# Patient Record
Sex: FEMALE | Race: BLACK OR AFRICAN AMERICAN | NOT HISPANIC OR LATINO | Employment: FULL TIME | URBAN - METROPOLITAN AREA
[De-identification: names, ages, dates, MRNs, and addresses within clinical notes are randomized per-mention and may not be internally consistent; named-entity substitution may affect disease eponyms.]

---

## 2018-08-21 ENCOUNTER — OFFICE VISIT CONVERTED (OUTPATIENT)
Dept: FAMILY MEDICINE CLINIC | Facility: CLINIC | Age: 53
End: 2018-08-21
Attending: NURSE PRACTITIONER

## 2018-08-21 ENCOUNTER — CONVERSION ENCOUNTER (OUTPATIENT)
Dept: FAMILY MEDICINE CLINIC | Facility: CLINIC | Age: 53
End: 2018-08-21

## 2018-10-16 ENCOUNTER — CONVERSION ENCOUNTER (OUTPATIENT)
Dept: GENERAL RADIOLOGY | Facility: HOSPITAL | Age: 53
End: 2018-10-16

## 2019-01-08 ENCOUNTER — OFFICE VISIT CONVERTED (OUTPATIENT)
Dept: FAMILY MEDICINE CLINIC | Facility: CLINIC | Age: 54
End: 2019-01-08
Attending: NURSE PRACTITIONER

## 2019-03-11 ENCOUNTER — OFFICE VISIT CONVERTED (OUTPATIENT)
Dept: FAMILY MEDICINE CLINIC | Facility: CLINIC | Age: 54
End: 2019-03-11
Attending: NURSE PRACTITIONER

## 2019-03-11 ENCOUNTER — HOSPITAL ENCOUNTER (OUTPATIENT)
Dept: FAMILY MEDICINE CLINIC | Facility: CLINIC | Age: 54
Discharge: HOME | End: 2019-03-11
Attending: NURSE PRACTITIONER

## 2019-03-14 LAB — EYE OR EAR CULTURE: NORMAL

## 2019-07-15 ENCOUNTER — OFFICE VISIT CONVERTED (OUTPATIENT)
Dept: FAMILY MEDICINE CLINIC | Facility: CLINIC | Age: 54
End: 2019-07-15
Attending: NURSE PRACTITIONER

## 2019-07-18 ENCOUNTER — HOSPITAL ENCOUNTER (OUTPATIENT)
Dept: LAB | Facility: HOSPITAL | Age: 54
Discharge: HOME | End: 2019-07-18
Attending: NURSE PRACTITIONER

## 2019-07-18 LAB
ALBUMIN SERPL-MCNC: 4.3 G/DL (ref 3.5–5)
ALBUMIN/GLOB SERPL: 1.5 {RATIO} (ref 1.4–2.6)
ALP SERPL-CCNC: 97 U/L (ref 53–141)
ALT SERPL-CCNC: 12 U/L (ref 10–40)
ANION GAP SERPL CALC-SCNC: 15 MMOL/L (ref 8–19)
AST SERPL-CCNC: 16 U/L (ref 15–50)
BASOPHILS # BLD AUTO: 0.02 10*3/UL (ref 0–0.2)
BASOPHILS NFR BLD AUTO: 0.5 % (ref 0–3)
BILIRUB SERPL-MCNC: 0.42 MG/DL (ref 0.2–1.3)
BUN SERPL-MCNC: 12 MG/DL (ref 5–25)
BUN/CREAT SERPL: 15 {RATIO} (ref 6–20)
CALCIUM SERPL-MCNC: 9.2 MG/DL (ref 8.7–10.4)
CHLORIDE SERPL-SCNC: 107 MMOL/L (ref 99–111)
CHOLEST SERPL-MCNC: 138 MG/DL (ref 107–200)
CHOLEST/HDLC SERPL: 3 {RATIO} (ref 3–6)
CONV ABS IMM GRAN: 0.01 10*3/UL (ref 0–0.2)
CONV CO2: 25 MMOL/L (ref 22–32)
CONV IMMATURE GRAN: 0.2 % (ref 0–1.8)
CONV TOTAL PROTEIN: 7.2 G/DL (ref 6.3–8.2)
CREAT UR-MCNC: 0.81 MG/DL (ref 0.5–0.9)
DEPRECATED RDW RBC AUTO: 41.7 FL (ref 36.4–46.3)
EOSINOPHIL # BLD AUTO: 0.12 10*3/UL (ref 0–0.7)
EOSINOPHIL # BLD AUTO: 2.8 % (ref 0–7)
ERYTHROCYTE [DISTWIDTH] IN BLOOD BY AUTOMATED COUNT: 12.6 % (ref 11.7–14.4)
GFR SERPLBLD BASED ON 1.73 SQ M-ARVRAT: >60 ML/MIN/{1.73_M2}
GLOBULIN UR ELPH-MCNC: 2.9 G/DL (ref 2–3.5)
GLUCOSE SERPL-MCNC: 90 MG/DL (ref 65–99)
HBA1C MFR BLD: 11.4 G/DL (ref 12–16)
HCT VFR BLD AUTO: 35.9 % (ref 37–47)
HDLC SERPL-MCNC: 46 MG/DL (ref 40–60)
LDLC SERPL CALC-MCNC: 77 MG/DL (ref 70–100)
LYMPHOCYTES # BLD AUTO: 2.37 10*3/UL (ref 1–5)
MCH RBC QN AUTO: 29.1 PG (ref 27–31)
MCHC RBC AUTO-ENTMCNC: 31.8 G/DL (ref 33–37)
MCV RBC AUTO: 91.6 FL (ref 81–99)
MONOCYTES # BLD AUTO: 0.41 10*3/UL (ref 0.2–1.2)
MONOCYTES NFR BLD AUTO: 9.7 % (ref 3–10)
NEUTROPHILS # BLD AUTO: 1.31 10*3/UL (ref 2–8)
NEUTROPHILS NFR BLD AUTO: 30.9 % (ref 30–85)
NRBC CBCN: 0 % (ref 0–0.7)
OSMOLALITY SERPL CALC.SUM OF ELEC: 295 MOSM/KG (ref 273–304)
PLATELET # BLD AUTO: 256 10*3/UL (ref 130–400)
PMV BLD AUTO: 10.7 FL (ref 9.4–12.3)
POTASSIUM SERPL-SCNC: 4.1 MMOL/L (ref 3.5–5.3)
RBC # BLD AUTO: 3.92 10*6/UL (ref 4.2–5.4)
SODIUM SERPL-SCNC: 143 MMOL/L (ref 135–147)
T4 FREE SERPL-MCNC: 0.9 NG/DL (ref 0.9–1.8)
TRIGL SERPL-MCNC: 77 MG/DL (ref 40–150)
TSH SERPL-ACNC: 0.64 M[IU]/L (ref 0.27–4.2)
VARIANT LYMPHS NFR BLD MANUAL: 55.9 % (ref 20–45)
VLDLC SERPL-MCNC: 15 MG/DL (ref 5–37)
WBC # BLD AUTO: 4.24 10*3/UL (ref 4.8–10.8)

## 2019-07-19 ENCOUNTER — HOSPITAL ENCOUNTER (OUTPATIENT)
Dept: LAB | Facility: HOSPITAL | Age: 54
Discharge: HOME | End: 2019-07-19
Attending: NURSE PRACTITIONER

## 2019-07-19 LAB
FOLATE SERPL-MCNC: 15.8 NG/ML (ref 4.8–20)
IRON SATN MFR SERPL: 26 % (ref 20–55)
IRON SERPL-MCNC: 82 UG/DL (ref 60–170)
TIBC SERPL-MCNC: 319 UG/DL (ref 245–450)
TRANSFERRIN SERPL-MCNC: 223 MG/DL (ref 250–380)
VIT B12 SERPL-MCNC: 356 PG/ML (ref 211–911)

## 2019-11-19 ENCOUNTER — OFFICE VISIT CONVERTED (OUTPATIENT)
Dept: FAMILY MEDICINE CLINIC | Facility: CLINIC | Age: 54
End: 2019-11-19
Attending: NURSE PRACTITIONER

## 2020-01-03 ENCOUNTER — HOSPITAL ENCOUNTER (OUTPATIENT)
Dept: GENERAL RADIOLOGY | Facility: HOSPITAL | Age: 55
Discharge: HOME | End: 2020-01-03
Attending: NURSE PRACTITIONER

## 2020-01-15 ENCOUNTER — CONVERSION ENCOUNTER (OUTPATIENT)
Dept: FAMILY MEDICINE CLINIC | Facility: CLINIC | Age: 55
End: 2020-01-15

## 2020-01-15 ENCOUNTER — HOSPITAL ENCOUNTER (OUTPATIENT)
Dept: FAMILY MEDICINE CLINIC | Facility: CLINIC | Age: 55
Discharge: HOME | End: 2020-01-15
Attending: NURSE PRACTITIONER

## 2020-01-15 ENCOUNTER — OFFICE VISIT CONVERTED (OUTPATIENT)
Dept: FAMILY MEDICINE CLINIC | Facility: CLINIC | Age: 55
End: 2020-01-15
Attending: NURSE PRACTITIONER

## 2020-01-16 ENCOUNTER — HOSPITAL ENCOUNTER (OUTPATIENT)
Dept: LAB | Facility: HOSPITAL | Age: 55
Discharge: HOME | End: 2020-01-16
Attending: NURSE PRACTITIONER

## 2020-01-16 LAB
ALBUMIN SERPL-MCNC: 4.2 G/DL (ref 3.5–5)
ALBUMIN/GLOB SERPL: 1.2 {RATIO} (ref 1.4–2.6)
ALP SERPL-CCNC: 125 U/L (ref 53–141)
ALT SERPL-CCNC: 18 U/L (ref 10–40)
ANION GAP SERPL CALC-SCNC: 14 MMOL/L (ref 8–19)
AST SERPL-CCNC: 21 U/L (ref 15–50)
BASOPHILS # BLD AUTO: 0.03 10*3/UL (ref 0–0.2)
BASOPHILS NFR BLD AUTO: 0.7 % (ref 0–3)
BILIRUB SERPL-MCNC: 0.27 MG/DL (ref 0.2–1.3)
BUN SERPL-MCNC: 10 MG/DL (ref 5–25)
BUN/CREAT SERPL: 13 {RATIO} (ref 6–20)
CALCIUM SERPL-MCNC: 9.3 MG/DL (ref 8.7–10.4)
CHLORIDE SERPL-SCNC: 106 MMOL/L (ref 99–111)
CHOLEST SERPL-MCNC: 148 MG/DL (ref 107–200)
CHOLEST/HDLC SERPL: 3 {RATIO} (ref 3–6)
CONV ABS IMM GRAN: 0.01 10*3/UL (ref 0–0.2)
CONV CO2: 24 MMOL/L (ref 22–32)
CONV IMMATURE GRAN: 0.2 % (ref 0–1.8)
CONV TOTAL PROTEIN: 7.6 G/DL (ref 6.3–8.2)
CREAT UR-MCNC: 0.8 MG/DL (ref 0.5–0.9)
DEPRECATED RDW RBC AUTO: 43.8 FL (ref 36.4–46.3)
EOSINOPHIL # BLD AUTO: 0.15 10*3/UL (ref 0–0.7)
EOSINOPHIL # BLD AUTO: 3.4 % (ref 0–7)
ERYTHROCYTE [DISTWIDTH] IN BLOOD BY AUTOMATED COUNT: 13 % (ref 11.7–14.4)
GFR SERPLBLD BASED ON 1.73 SQ M-ARVRAT: >60 ML/MIN/{1.73_M2}
GLOBULIN UR ELPH-MCNC: 3.4 G/DL (ref 2–3.5)
GLUCOSE SERPL-MCNC: 85 MG/DL (ref 65–99)
HCT VFR BLD AUTO: 42 % (ref 37–47)
HDLC SERPL-MCNC: 49 MG/DL (ref 40–60)
HGB BLD-MCNC: 12.9 G/DL (ref 12–16)
LDLC SERPL CALC-MCNC: 91 MG/DL (ref 70–100)
LYMPHOCYTES # BLD AUTO: 2.6 10*3/UL (ref 1–5)
LYMPHOCYTES NFR BLD AUTO: 58.4 % (ref 20–45)
MCH RBC QN AUTO: 28.1 PG (ref 27–31)
MCHC RBC AUTO-ENTMCNC: 30.7 G/DL (ref 33–37)
MCV RBC AUTO: 91.5 FL (ref 81–99)
MONOCYTES # BLD AUTO: 0.41 10*3/UL (ref 0.2–1.2)
MONOCYTES NFR BLD AUTO: 9.2 % (ref 3–10)
NEUTROPHILS # BLD AUTO: 1.25 10*3/UL (ref 2–8)
NEUTROPHILS NFR BLD AUTO: 28.1 % (ref 30–85)
NRBC CBCN: 0 % (ref 0–0.7)
OSMOLALITY SERPL CALC.SUM OF ELEC: 288 MOSM/KG (ref 273–304)
PLATELET # BLD AUTO: 290 10*3/UL (ref 130–400)
PMV BLD AUTO: 10.5 FL (ref 9.4–12.3)
POTASSIUM SERPL-SCNC: 4.1 MMOL/L (ref 3.5–5.3)
RBC # BLD AUTO: 4.59 10*6/UL (ref 4.2–5.4)
SODIUM SERPL-SCNC: 140 MMOL/L (ref 135–147)
T4 FREE SERPL-MCNC: 1 NG/DL (ref 0.9–1.8)
TRIGL SERPL-MCNC: 40 MG/DL (ref 40–150)
TSH SERPL-ACNC: 0.82 M[IU]/L (ref 0.27–4.2)
VLDLC SERPL-MCNC: 8 MG/DL (ref 5–37)
WBC # BLD AUTO: 4.45 10*3/UL (ref 4.8–10.8)

## 2020-01-20 LAB
CONV LAST MENSTURAL PERIOD: NORMAL
SPECIMEN SOURCE: NORMAL
SPECIMEN SOURCE: NORMAL
THIN PREP CVX: NORMAL

## 2020-07-27 ENCOUNTER — OFFICE VISIT CONVERTED (OUTPATIENT)
Dept: FAMILY MEDICINE CLINIC | Facility: CLINIC | Age: 55
End: 2020-07-27
Attending: NURSE PRACTITIONER

## 2020-07-27 ENCOUNTER — HOSPITAL ENCOUNTER (OUTPATIENT)
Dept: LAB | Facility: HOSPITAL | Age: 55
Discharge: HOME | End: 2020-07-27
Attending: NURSE PRACTITIONER

## 2020-07-27 LAB
ALBUMIN SERPL-MCNC: 4.1 G/DL (ref 3.5–5)
ALBUMIN/GLOB SERPL: 1.4 {RATIO} (ref 1.4–2.6)
ALP SERPL-CCNC: 116 U/L (ref 53–141)
ALT SERPL-CCNC: 12 U/L (ref 10–40)
ANION GAP SERPL CALC-SCNC: 14 MMOL/L (ref 8–19)
AST SERPL-CCNC: 17 U/L (ref 15–50)
BASOPHILS # BLD AUTO: 0.02 10*3/UL (ref 0–0.2)
BASOPHILS NFR BLD AUTO: 0.5 % (ref 0–3)
BILIRUB SERPL-MCNC: 0.32 MG/DL (ref 0.2–1.3)
BUN SERPL-MCNC: 12 MG/DL (ref 5–25)
BUN/CREAT SERPL: 15 {RATIO} (ref 6–20)
CALCIUM SERPL-MCNC: 9.2 MG/DL (ref 8.7–10.4)
CHLORIDE SERPL-SCNC: 103 MMOL/L (ref 99–111)
CHOLEST SERPL-MCNC: 201 MG/DL (ref 107–200)
CHOLEST/HDLC SERPL: 3.3 {RATIO} (ref 3–6)
CONV ABS IMM GRAN: 0.01 10*3/UL (ref 0–0.2)
CONV CO2: 27 MMOL/L (ref 22–32)
CONV IMMATURE GRAN: 0.3 % (ref 0–1.8)
CONV TOTAL PROTEIN: 7.1 G/DL (ref 6.3–8.2)
CREAT UR-MCNC: 0.8 MG/DL (ref 0.5–0.9)
DEPRECATED RDW RBC AUTO: 42.2 FL (ref 36.4–46.3)
EOSINOPHIL # BLD AUTO: 0.1 10*3/UL (ref 0–0.7)
EOSINOPHIL # BLD AUTO: 2.5 % (ref 0–7)
ERYTHROCYTE [DISTWIDTH] IN BLOOD BY AUTOMATED COUNT: 12.6 % (ref 11.7–14.4)
GFR SERPLBLD BASED ON 1.73 SQ M-ARVRAT: >60 ML/MIN/{1.73_M2}
GLOBULIN UR ELPH-MCNC: 3 G/DL (ref 2–3.5)
GLUCOSE SERPL-MCNC: 84 MG/DL (ref 65–99)
HCT VFR BLD AUTO: 40.3 % (ref 37–47)
HDLC SERPL-MCNC: 61 MG/DL (ref 40–60)
HGB BLD-MCNC: 12.9 G/DL (ref 12–16)
LDLC SERPL CALC-MCNC: 128 MG/DL (ref 70–100)
LYMPHOCYTES # BLD AUTO: 2.07 10*3/UL (ref 1–5)
LYMPHOCYTES NFR BLD AUTO: 52.1 % (ref 20–45)
MCH RBC QN AUTO: 29.1 PG (ref 27–31)
MCHC RBC AUTO-ENTMCNC: 32 G/DL (ref 33–37)
MCV RBC AUTO: 91 FL (ref 81–99)
MONOCYTES # BLD AUTO: 0.4 10*3/UL (ref 0.2–1.2)
MONOCYTES NFR BLD AUTO: 10.1 % (ref 3–10)
NEUTROPHILS # BLD AUTO: 1.37 10*3/UL (ref 2–8)
NEUTROPHILS NFR BLD AUTO: 34.5 % (ref 30–85)
NRBC CBCN: 0 % (ref 0–0.7)
OSMOLALITY SERPL CALC.SUM OF ELEC: 289 MOSM/KG (ref 273–304)
PLATELET # BLD AUTO: 261 10*3/UL (ref 130–400)
PMV BLD AUTO: 10.5 FL (ref 9.4–12.3)
POTASSIUM SERPL-SCNC: 4.4 MMOL/L (ref 3.5–5.3)
RBC # BLD AUTO: 4.43 10*6/UL (ref 4.2–5.4)
SODIUM SERPL-SCNC: 140 MMOL/L (ref 135–147)
T4 FREE SERPL-MCNC: 1 NG/DL (ref 0.9–1.8)
TRIGL SERPL-MCNC: 59 MG/DL (ref 40–150)
TSH SERPL-ACNC: 0.89 M[IU]/L (ref 0.27–4.2)
VLDLC SERPL-MCNC: 12 MG/DL (ref 5–37)
WBC # BLD AUTO: 3.97 10*3/UL (ref 4.8–10.8)

## 2020-09-01 ENCOUNTER — HOSPITAL ENCOUNTER (OUTPATIENT)
Dept: GENERAL RADIOLOGY | Facility: HOSPITAL | Age: 55
Discharge: HOME | End: 2020-09-01
Attending: NURSE PRACTITIONER

## 2020-09-01 ENCOUNTER — TELEMEDICINE CONVERTED (OUTPATIENT)
Dept: FAMILY MEDICINE CLINIC | Facility: CLINIC | Age: 55
End: 2020-09-01
Attending: NURSE PRACTITIONER

## 2020-09-18 ENCOUNTER — OFFICE VISIT CONVERTED (OUTPATIENT)
Dept: ORTHOPEDIC SURGERY | Facility: CLINIC | Age: 55
End: 2020-09-18
Attending: ORTHOPAEDIC SURGERY

## 2020-10-07 ENCOUNTER — HOSPITAL ENCOUNTER (OUTPATIENT)
Dept: MRI IMAGING | Facility: HOSPITAL | Age: 55
Discharge: HOME | End: 2020-10-07
Attending: ORTHOPAEDIC SURGERY

## 2020-10-13 ENCOUNTER — OFFICE VISIT CONVERTED (OUTPATIENT)
Dept: ORTHOPEDIC SURGERY | Facility: CLINIC | Age: 55
End: 2020-10-13
Attending: ORTHOPAEDIC SURGERY

## 2020-12-21 ENCOUNTER — TELEMEDICINE CONVERTED (OUTPATIENT)
Dept: FAMILY MEDICINE CLINIC | Facility: CLINIC | Age: 55
End: 2020-12-21
Attending: NURSE PRACTITIONER

## 2020-12-22 ENCOUNTER — HOSPITAL ENCOUNTER (OUTPATIENT)
Dept: FAMILY MEDICINE CLINIC | Facility: CLINIC | Age: 55
Discharge: HOME | End: 2020-12-22
Attending: NURSE PRACTITIONER

## 2020-12-23 LAB — SARS-COV-2 RNA SPEC QL NAA+PROBE: NOT DETECTED

## 2021-01-27 ENCOUNTER — TELEMEDICINE CONVERTED (OUTPATIENT)
Dept: FAMILY MEDICINE CLINIC | Facility: CLINIC | Age: 56
End: 2021-01-27
Attending: NURSE PRACTITIONER

## 2021-02-19 ENCOUNTER — HOSPITAL ENCOUNTER (OUTPATIENT)
Dept: GENERAL RADIOLOGY | Facility: HOSPITAL | Age: 56
Discharge: HOME | End: 2021-02-19
Attending: NURSE PRACTITIONER

## 2021-03-11 ENCOUNTER — OFFICE VISIT CONVERTED (OUTPATIENT)
Dept: FAMILY MEDICINE CLINIC | Facility: CLINIC | Age: 56
End: 2021-03-11
Attending: NURSE PRACTITIONER

## 2021-03-11 ENCOUNTER — CONVERSION ENCOUNTER (OUTPATIENT)
Dept: FAMILY MEDICINE CLINIC | Facility: CLINIC | Age: 56
End: 2021-03-11

## 2021-03-11 LAB — B-HEM STREP SPEC QL CULT: NEGATIVE

## 2021-03-12 ENCOUNTER — HOSPITAL ENCOUNTER (OUTPATIENT)
Dept: FAMILY MEDICINE CLINIC | Facility: CLINIC | Age: 56
Discharge: HOME | End: 2021-03-12
Attending: NURSE PRACTITIONER

## 2021-03-14 LAB — BACTERIA SPEC AEROBE CULT: NORMAL

## 2021-04-07 ENCOUNTER — HOSPITAL ENCOUNTER (OUTPATIENT)
Dept: LAB | Facility: HOSPITAL | Age: 56
Discharge: HOME | End: 2021-04-07
Attending: NURSE PRACTITIONER

## 2021-04-07 LAB
ALBUMIN SERPL-MCNC: 3.9 G/DL (ref 3.5–5)
ALBUMIN/GLOB SERPL: 1.2 {RATIO} (ref 1.4–2.6)
ALP SERPL-CCNC: 113 U/L (ref 53–141)
ALT SERPL-CCNC: 14 U/L (ref 10–40)
ANION GAP SERPL CALC-SCNC: 15 MMOL/L (ref 8–19)
AST SERPL-CCNC: 18 U/L (ref 15–50)
BASOPHILS # BLD AUTO: 0.02 10*3/UL (ref 0–0.2)
BASOPHILS NFR BLD AUTO: 0.6 % (ref 0–3)
BILIRUB SERPL-MCNC: 0.3 MG/DL (ref 0.2–1.3)
BUN SERPL-MCNC: 8 MG/DL (ref 5–25)
BUN/CREAT SERPL: 9 {RATIO} (ref 6–20)
CALCIUM SERPL-MCNC: 9.3 MG/DL (ref 8.7–10.4)
CHLORIDE SERPL-SCNC: 108 MMOL/L (ref 99–111)
CHOLEST SERPL-MCNC: 150 MG/DL (ref 107–200)
CHOLEST/HDLC SERPL: 2.6 {RATIO} (ref 3–6)
CONV ABS IMM GRAN: 0 10*3/UL (ref 0–0.2)
CONV CO2: 23 MMOL/L (ref 22–32)
CONV IMMATURE GRAN: 0 % (ref 0–1.8)
CONV TOTAL PROTEIN: 7.2 G/DL (ref 6.3–8.2)
CREAT UR-MCNC: 0.86 MG/DL (ref 0.5–0.9)
DEPRECATED RDW RBC AUTO: 42.8 FL (ref 36.4–46.3)
EOSINOPHIL # BLD AUTO: 0.15 10*3/UL (ref 0–0.7)
EOSINOPHIL # BLD AUTO: 4.4 % (ref 0–7)
ERYTHROCYTE [DISTWIDTH] IN BLOOD BY AUTOMATED COUNT: 13 % (ref 11.7–14.4)
GFR SERPLBLD BASED ON 1.73 SQ M-ARVRAT: >60 ML/MIN/{1.73_M2}
GLOBULIN UR ELPH-MCNC: 3.3 G/DL (ref 2–3.5)
GLUCOSE SERPL-MCNC: 84 MG/DL (ref 65–99)
HCT VFR BLD AUTO: 38.1 % (ref 37–47)
HDLC SERPL-MCNC: 58 MG/DL (ref 40–60)
HGB BLD-MCNC: 12.3 G/DL (ref 12–16)
LDLC SERPL CALC-MCNC: 81 MG/DL (ref 70–100)
LYMPHOCYTES # BLD AUTO: 1.96 10*3/UL (ref 1–5)
LYMPHOCYTES NFR BLD AUTO: 57.6 % (ref 20–45)
MCH RBC QN AUTO: 29.1 PG (ref 27–31)
MCHC RBC AUTO-ENTMCNC: 32.3 G/DL (ref 33–37)
MCV RBC AUTO: 90.3 FL (ref 81–99)
MONOCYTES # BLD AUTO: 0.43 10*3/UL (ref 0.2–1.2)
MONOCYTES NFR BLD AUTO: 12.6 % (ref 3–10)
NEUTROPHILS # BLD AUTO: 0.84 10*3/UL (ref 2–8)
NEUTROPHILS NFR BLD AUTO: 24.8 % (ref 30–85)
NRBC CBCN: 0 % (ref 0–0.7)
OSMOLALITY SERPL CALC.SUM OF ELEC: 292 MOSM/KG (ref 273–304)
PLATELET # BLD AUTO: 276 10*3/UL (ref 130–400)
PMV BLD AUTO: 10.4 FL (ref 9.4–12.3)
POTASSIUM SERPL-SCNC: 4 MMOL/L (ref 3.5–5.3)
RBC # BLD AUTO: 4.22 10*6/UL (ref 4.2–5.4)
SODIUM SERPL-SCNC: 142 MMOL/L (ref 135–147)
T4 FREE SERPL-MCNC: 1.1 NG/DL (ref 0.9–1.8)
TRIGL SERPL-MCNC: 56 MG/DL (ref 40–150)
TSH SERPL-ACNC: 0.66 M[IU]/L (ref 0.27–4.2)
VLDLC SERPL-MCNC: 11 MG/DL (ref 5–37)
WBC # BLD AUTO: 3.4 10*3/UL (ref 4.8–10.8)

## 2021-05-10 ENCOUNTER — HOSPITAL ENCOUNTER (OUTPATIENT)
Dept: LAB | Facility: HOSPITAL | Age: 56
Discharge: HOME | End: 2021-05-10
Attending: NURSE PRACTITIONER

## 2021-05-10 LAB
BASOPHILS # BLD AUTO: 0.03 10*3/UL (ref 0–0.2)
BASOPHILS NFR BLD AUTO: 0.5 % (ref 0–3)
CONV ABS IMM GRAN: 0.02 10*3/UL (ref 0–0.2)
CONV IMMATURE GRAN: 0.4 % (ref 0–1.8)
DEPRECATED RDW RBC AUTO: 40.9 FL (ref 36.4–46.3)
EOSINOPHIL # BLD AUTO: 0.18 10*3/UL (ref 0–0.7)
EOSINOPHIL # BLD AUTO: 3.2 % (ref 0–7)
ERYTHROCYTE [DISTWIDTH] IN BLOOD BY AUTOMATED COUNT: 12.5 % (ref 11.7–14.4)
HCT VFR BLD AUTO: 36.4 % (ref 37–47)
HGB BLD-MCNC: 11.8 G/DL (ref 12–16)
LYMPHOCYTES # BLD AUTO: 2.93 10*3/UL (ref 1–5)
LYMPHOCYTES NFR BLD AUTO: 52.4 % (ref 20–45)
MCH RBC QN AUTO: 29.1 PG (ref 27–31)
MCHC RBC AUTO-ENTMCNC: 32.4 G/DL (ref 33–37)
MCV RBC AUTO: 89.7 FL (ref 81–99)
MONOCYTES # BLD AUTO: 0.61 10*3/UL (ref 0.2–1.2)
MONOCYTES NFR BLD AUTO: 10.9 % (ref 3–10)
NEUTROPHILS # BLD AUTO: 1.82 10*3/UL (ref 2–8)
NEUTROPHILS NFR BLD AUTO: 32.6 % (ref 30–85)
NRBC CBCN: 0 % (ref 0–0.7)
PLATELET # BLD AUTO: 256 10*3/UL (ref 130–400)
PMV BLD AUTO: 10.4 FL (ref 9.4–12.3)
RBC # BLD AUTO: 4.06 10*6/UL (ref 4.2–5.4)
WBC # BLD AUTO: 5.59 10*3/UL (ref 4.8–10.8)

## 2021-05-10 NOTE — H&P
History and Physical      Patient Name: Swapna Tay   Patient ID: [id number]   Sex: Female   YOB: 1965    Primary Care Provider: Samantha CROWE   Referring Provider: Samantha CROWE    Visit Date: 2020    Provider: Garrick Parra MD   Location: [facility]   Location Address: [address] [place] [address]  [id number]   Location Phone: [phone]          Chief Complaint  · Left wrist pain      History Of Present Illness  Swapna Tay is a 54 year old /Black female who presents today to [facility].        The patient presents here today for evaluation of left wrist pain. She has had previous EMG in [place] in the past and was diagnosed with carpal tunnel.  Patient complains of numbness and tingling and pain.   She states this is worse at night and it wakes her up frequently.       Past Medical History  Allergic rhinitis; Carpal tunnel syndrome, bilateral; Cataract; Contact dermatitis; Eczema; Gastric Ulcer; Genital herpes; High cholesterol; Hyperlipidemia; Hypertension; Menopause; Pap smear for cervical cancer screening; Plantar fasciitis; Restless leg syndrome; Screening Mammogram; Seasonal allergies         Past Surgical History  *Other Surgery; ; Colonoscopy; Hernia; Hernia Repair         Medication List  Claritin 10 mg oral tablet; Crestor 10 mg oral tablet; fluticasone propionate 50 mcg/actuation nasal spray,suspension; telmisartan 20 mg oral tablet; triamcinolone acetonide 0.1 % topical cream; valacyclovir 500 mg oral tablet; venlafaxine 75 mg oral tablet; Vitamin C 1,000 mg oral tablet; Vitamin D3 400 unit oral tablet         Allergy List  NO KNOWN DRUG ALLERGIES; Lactose intolerant; NO KNOWN DRUG ALLERGIES       Allergies Reconciled  Family Medical History  Stroke; Cancer, Unspecified; Diabetes, unspecified type; Colon Cancer; Ovarian Cancer, Family History; Family history of Arthritis         Social  "History  Alcohol (Never); Alcohol Use (Never); Exercises regularly; lives with children; lives with spouse; ; .; Recreational Drug Use (Never); Tobacco (Never); Working         Review of Systems  · Constitutional  o Denies  o : fever, chills, weight loss  · Cardiovascular  o Denies  o : chest pain, shortness of breath  · Gastrointestinal  o Denies  o : liver disease, heartburn, nausea, blood in stools  · Genitourinary  o Denies  o : painful urination, blood in urine  · Integument  o Denies  o : rash, itching  · Neurologic  o Denies  o : headache, weakness, loss of consciousness  · Musculoskeletal  o Denies  o : painful, swollen joints  · Psychiatric  o Denies  o : drug/alcohol addiction, anxiety, depression      Vitals  Date Time BP Position Site L\R Cuff Size HR RR TEMP (F) WT  HT  BMI kg/m2 BSA m2 O2 Sat        09/18/2020 02:43 PM         173lbs 2oz 5'  4.5\" 29.26 1.89           Physical Examination  · Constitutional  o Appearance  o : well developed, well-nourished, no obvious deformities present  · Head and Face  o Head  o :   § Inspection  § : normocephalic  o Face  o :   § Inspection  § : no facial lesions  · Eyes  o Conjunctivae  o : conjunctivae normal  o Sclerae  o : sclerae white  · Ears, Nose, Mouth and Throat  o Ears  o :   § External Ears  § : appearance within normal limits  § Hearing  § : intact  o Nose  o :   § External Nose  § : appearance normal  · Neck  o Inspection/Palpation  o : normal appearance  o Range of Motion  o : full range of motion  · Respiratory  o Respiratory Effort  o : breathing unlabored  o Inspection of Chest  o : normal appearance  o Auscultation of Lungs  o : no audible wheezing or rales  · Cardiovascular  o Heart  o : regular rate  · Gastrointestinal  o Abdominal Examination  o : soft and non-tender  · Skin and Subcutaneous Tissue  o General Inspection  o : intact, no rashes  · Psychiatric  o General  o : Alert and oriented x3  o Judgement and Insight  o : " judgment and insight intact  o Mood and Affect  o : mood normal, affect appropriate  · Left Wrist  o Inspection  o : Positive compression Negative tinels. No atrophy; strength 5/5 AIN, PIN, ulnar nerve. Tender over distal ulnar FCU nerve. Pain with wrist Extension. Extension 80, Flexion 70.   · Imaging  o Imaging  o : X-ray [facility] 09/2020: Localized soft tissue swelling along the ulnar aspect of the wrist. No acute bone findings.           Assessment  · Arthralgia of left hand     719.44/M25.542  · Left wrist pain/ paresthesia     719.43/M25.532      Plan  · Medications  o Medications have been Reconciled  o Transition of Care or Provider Policy  · Instructions  o X-rays reviewed by Dr. Parra.  o Reviewed the patient's Past Medical, Social, and Family history as well as the ROS at today's visit, no changes.  o Call or return if worsening symptoms.  o Follow up after MRI.  o Follow Up after EMG.  o The above service was scribed by Shanon Lundy on my behalf and I attest to the accuracy of the note. jsb  o Discussed treatment options with the patient. We recommended getting a MRI and having a EMG for further evaluation. Patient was given a carpal tunnel brace today.   o Electronically Identified Patient Education Materials Provided Electronically  · Referrals  o ID: [id number] Date: 09/17/2020 Type: Inbound  Specialty: Orthopedic Surgery            Electronically Signed by: Shanon Lundy MA -Author on September 21, 2020 03:17:07 PM  Electronically Co-signed by: Garrick Parra MD -Reviewer on September 21, 2020 09:00:14 PM

## 2021-05-13 NOTE — PROGRESS NOTES
Progress Note      Patient Name: Swapna Tay   Patient ID: [id number]   Sex: Female   YOB: 1965    Primary Care Provider: Samantha CROWE   Referring Provider: Samantha CROWE    Visit Date: September 1, 2020    Provider: AMRITA Givens   Location: [facility]   Location Address: [address] [place] [address], [address]  [place], [place]  [id number]   Location Phone: [phone]          Chief Complaint  · left wrist pain      History Of Present Illness  Video Conferencing Visit  Swapna Tay is a 54 year old /Black female who is presenting for evaluation via video conferencing via Duo. Verbal consent obtained before beginning visit.   The following staff were present during this visit: AMRITA Givens and EMILY Sanchez   Swapna Tay is a 54 year old /Black female who presents for evaluation and treatment of:      Pt has c/o left wrist pain. Pt states s/s have been present for approximately 1.5 weeks. Pt states it hurts when bending backwards, picking something up, and hurts to the touch. Pt states there has not been any kind of injury of any sort. Pt has taken motrin and volterin gel, with no relief.     Pt states she has not been taking Crestor. She states her most recent labs showed cholesterol slightly elevated but not enough for medication.            Past Medical History  Disease Name Date Onset Notes   Allergic rhinitis --  --    Carpal tunnel syndrome, bilateral --  --    Cataract --  --    Contact dermatitis --  --    Eczema --  --    Gastric Ulcer --  --    Genital herpes --  --    High cholesterol --  --    Hyperlipidemia 08/30/2018 --    Hypertension --  --    Menopause --  --    Pap smear for cervical cancer screening 1/15/2020 Normal repeat 5 yrs.   Plantar fasciitis --  --    Restless leg syndrome --  --    Screening Mammogram 10/2018 --          Past Surgical History  Procedure Name Date Notes   *Other  Surgery 2010 [facility] Uterine ballon therapy- Dr. Douglas/ [place]     --    Colonoscopy  --    Hernia Repair ,  --          Medication List  Name Date Started Instructions   Claritin 10 mg oral tablet 2020 take 1 tablet (10 mg) by oral route once daily for 90 days   Crestor 10 mg oral tablet 2020 take 1 tablet (10 mg) by oral route once daily at bedtime for 90 days   fluticasone propionate 50 mcg/actuation nasal spray,suspension 07/15/2019 spray 1 - 2 sprays (50 - 100 mcg) in each nostril by intranasal route once daily as needed for 90 days   telmisartan 20 mg oral tablet 2020 take 1 tablet (20 mg) by oral route once daily   triamcinolone acetonide 0.1 % topical cream 2020 apply a thin layer to the affected area(s) by topical route 2 times per day   valacyclovir 500 mg oral tablet 2020 take 1 tablet (500 mg) by oral route once daily for 30 days   venlafaxine 75 mg oral tablet 2020 take 1 tablet by oral route once a day (at bedtime) for 90 days   Vitamin C 1,000 mg oral tablet  --    Vitamin D3 400 unit oral tablet  --          Allergy List  Allergen Name Date Reaction Notes   Lactose intolerant --  --  Milk and cheese   NO KNOWN DRUG ALLERGIES --  --  --          Family Medical History  Disease Name Relative/Age Notes   Colon Cancer Brother/   --    Ovarian Cancer, Family History Sister/   --          Social History  Finding Status Start/Stop Quantity Notes   Alcohol Never --/-- --  --    Exercises regularly --  --/-- --  Walking 1 mile daily    --  --/-- --  --    Tobacco Never --/-- --  --          Immunizations  NameDate Admin Mfg Trade Name Lot Number Route Inj VIS Given VIS Publication   [id number]/07/2018 PMC Fluzone Quadrivalent [id number] IM LA 2018   Comments: Patient tolerated well.   [id number] SKB BOOSTRIX [id number] IM RA 2018   Comments: Patient tolerated well.         Review of  Systems  · Constitutional  o Denies  o : fever, fatigue, weight loss, weight gain  · Cardiovascular  o Denies  o : lower extremity edema, claudication, chest pressure, palpitations  · Respiratory  o Denies  o : shortness of breath, wheezing, cough, hemoptysis, dyspnea on exertion  · Gastrointestinal  o Denies  o : nausea, vomiting, diarrhea, constipation, abdominal pain  · Musculoskeletal  o Admits  o : wrist pain      Physical Examination  · Constitutional  o Appearance  o : well-nourished, well developed, alert, in no acute distress  · Musculoskeletal  o Left Upper Extremity  o :   § Range of Motion  § : range of motion normal, no joint crepitus present, no in wrist with ROM  · Neurologic  o Mental Status Examination  o :   § Orientation  § : grossly oriented to person, place and time  · Psychiatric  o Mood and Affect  o : mood normal, affect appropriate, denies any SI/HI          Assessment  · Allergic rhinitis due to allergen     477.9/J30.9  · Essential hypertension     401.9/I10  · Hyperlipidemia     272.4/E78.5  · Vitamin D deficiency     268.9/E55.9  · Left wrist pain     719.43/M25.532      Plan  · Orders  o ACO-39: Current medications updated and reviewed () - - 09/01/2020  o Xray wrist 3v left [facility] ([id number]) - - 09/01/2020  · Medications  o Medications have been Reconciled  o Transition of Care or Provider Policy  · Instructions  o Advised that cheeses and other sources of dairy fats, animal fats, fast food, and the extras (candy, pastries, pies, doughnuts and cookies) all contain LDL raising nutrients. Advised to increase fruits, vegetables, whole grains, and to monitor portion sizes.   o Patient was educated/instructed on their diagnosis, treatment and medications prior to discharge from the clinic today.  o Patient instructed to seek medical attention urgently for new or worsening symptoms.  o Call the office with any concerns or questions.  · Disposition  o Call or Return if  symptoms worsen or persist.            Electronically Signed by: AMRITA Givens -Author on September 1, 2020 01:47:38 PM

## 2021-05-13 NOTE — PROGRESS NOTES
Progress Note      Patient Name: Swapna Tay   Patient ID: [id number]   Sex: Female   YOB: 1965    Primary Care Provider: Samantha CROWE   Referring Provider: Samantha CROWE    Visit Date: 2020    Provider: AMRITA Givens   Location: [facility]   Location Address: [address] [place] [address], [address]  [place], [place]  [id number]   Location Phone: [phone]          Chief Complaint  · 6 month follow up      History Of Present Illness  Swapna Tay is a 54 year old /Black female who presents for evaluation and treatment of:      Pt is here for a 6 month follow up, she is doing well, no issues at this time. She does not need refills at this time and is due for lab work.       Past Medical History  Disease Name Date Onset Notes   Allergic rhinitis --  --    Carpal tunnel syndrome, bilateral --  --    Cataract --  --    Contact dermatitis --  --    Eczema --  --    Gastric Ulcer --  --    Genital herpes --  --    High cholesterol --  --    Hyperlipidemia 2018 --    Hypertension --  --    Menopause --  --    Pap smear for cervical cancer screening 1/15/2020 Normal repeat 5 yrs.   Plantar fasciitis --  --    Restless leg syndrome --  --    Screening Mammogram 10/2018 --          Past Surgical History  Procedure Name Date Notes   *Other Surgery  [facility] Uterine ballon therapy- Dr. Douglas/ [place]     --    Colonoscopy  --    Hernia Repair ,  --          Medication List  Name Date Started Instructions   Claritin 10 mg oral tablet 2020 take 1 tablet (10 mg) by oral route once daily for 90 days   Crestor 10 mg oral tablet 2020 take 1 tablet (10 mg) by oral route once daily at bedtime for 90 days   fluticasone propionate 50 mcg/actuation nasal spray,suspension 07/15/2019 spray 1 - 2 sprays (50 - 100 mcg) in each nostril by intranasal route once daily as needed for 90 days   telmisartan 20 mg oral  "tablet 06/01/2020 take 1 tablet (20 mg) by oral route once daily   triamcinolone acetonide 0.1 % topical cream 03/16/2020 apply a thin layer to the affected area(s) by topical route 2 times per day   valacyclovir 500 mg oral tablet 07/24/2020 take 1 tablet (500 mg) by oral route once daily for 30 days   venlafaxine 75 mg oral tablet 06/01/2020 take 1 tablet by oral route once a day (at bedtime) for 90 days   Vitamin C 1,000 mg oral tablet  --    Vitamin D3 400 unit oral tablet  --          Allergy List  Allergen Name Date Reaction Notes   Lactose intolerant --  --  Milk and cheese   NO KNOWN DRUG ALLERGIES --  --  --          Family Medical History  Disease Name Relative/Age Notes   Colon Cancer Brother/   --    Ovarian Cancer, Family History Sister/   --          Social History  Finding Status Start/Stop Quantity Notes   Alcohol Never --/-- --  --    Exercises regularly --  --/-- --  Walking 1 mile daily    --  --/-- --  --    Tobacco Never --/-- --  --          Immunizations  NameDate Admin Mfg Trade Name Lot Number Route Inj VIS Given VIS Publication   [id number]/07/2018 PMC Fluzone Quadrivalent [id number] IM LA 11/07/2018 08/07/2015   Comments: Patient tolerated well.   Tdap11/09/2018 SKB BOOSTRIX [id number] IM RA 11/09/2018 02/24/2015   Comments: Patient tolerated well.         Review of Systems  · Constitutional  o Denies  o : fever, fatigue, weight loss, weight gain  · Cardiovascular  o Denies  o : lower extremity edema, claudication, chest pressure, palpitations  · Respiratory  o Denies  o : shortness of breath, wheezing, cough, hemoptysis, dyspnea on exertion  · Gastrointestinal  o Denies  o : nausea, vomiting, diarrhea, constipation, abdominal pain      Vitals  Date Time BP Position Site L\R Cuff Size HR RR TEMP (F) WT  HT  BMI kg/m2 BSA m2 O2 Sat        07/15/2019 11:55 /84 Sitting    70 - R   177lbs 6oz 5'  4.5\" 29.98 1.91 97 %    11/19/2019 01:40 /95 Sitting    71 - R  98.2 159lbs 6oz 5' " " 4.5\" 26.93 1.81 99 %    01/15/2020 09:04 /95 Sitting    91 - R  97.5 162lbs 6oz 5'  4.5\" 27.44 1.83 98 %    07/27/2020 08:31 /92 Sitting    71 - R   173lbs 0oz 5'  4.5\" 29.24 1.89 99 %          Physical Examination  · Constitutional  o Appearance  o : well-nourished, well developed, alert, in no acute distress  · Ears, Nose, Mouth and Throat  o Ears  o :   § External Ears  § : appearance within normal limits, no lesions present  § Otoscopic Examination  § : tympanic membrane appearance within normal limits bilaterally without perforations, mobility normal  o Nose  o :   § External Nose  § : normal stucture noted.  § Intranasal Exam  § : no swelling, reddness, turbs normal dimas.  o Oral Cavity  o :   § Oral Mucosa  § : oral mucosa normal without pallor or cyanosis  § Lips  § : lip appearance normal  § Teeth  § : normal dentition for age  § Gums  § : gums pink, non-swollen, no bleeding present  § Tongue  § : tongue appearance normal  § Palate  § : hard palate normal, soft palate appearance normal  o Throat  o :   § Oropharynx  § : no inflammation or lesions present, tonsils within normal limits  · Respiratory  o Respiratory Effort  o : breathing unlabored  o Auscultation of Lungs  o : normal breath sounds throughout  · Cardiovascular  o Heart  o :   § Auscultation of Heart  § : regular rate and rhythm, no murmurs, gallops or rubs  § Palpation of Heart  § : normal apical impulse, no cardiac thrill present  o Peripheral Vascular System  o :   § Extremities  § : no cyanosis, clubbing or edema; less than 2 second refill noted  · Gastrointestinal  o Abdominal Examination  o : abdomen nontender to palpation, tone normal without rigidity or guarding, no masses present, abdominal contour scaphoid  o Liver and spleen  o : no hepatomegaly present, liver nontender to palpation, spleen not palpable  · Musculoskeletal  o Right Upper Extremity  o :   § Inspection/Palpation  § : no tenderness to palpation  § Range of " Motion  § : range of motion normal, no joint crepitus or pain with motion present  o Left Upper Extremity  o :   § Inspection/Palpation  § : no tenderness to palpation  § Range of Motion  § : range of motion normal, no joint crepitus present, no pain with joint motion  o Right Lower Extremity  o :   § Inspection/Palpation  § : no joint or limb tenderness to palpation  § Range of Motion  § : range of motion normal, no joint crepitations present, no pain on motion  o Left Lower Extremity  o :   § Inspection/Palpation  § : no joint or limb tenderness to palpation  § Range of Motion  § : range of motion normal, no joint crepitations present, no pain on motion  · Skin and Subcutaneous Tissue  o General Inspection  o : no rashes or lesions present, no areas of discoloration  · Neurologic  o Mental Status Examination  o :   § Orientation  § : grossly oriented to person, place and time  o Cranial Nerves  o : cranial nerves intact and symmetric throughout  · Psychiatric  o Mood and Affect  o : mood normal, affect appropriate          Assessment  · Hyperlipidemia     272.4/E78.5  · Allergic rhinitis     477.9/J30.9  · Eczema     692.9/L30.9  · Hypertension     401.9/I10  · Restless leg syndrome     333.94/G25.81    Problems Reconciled  Plan  · Orders  o CBC with Auto Diff [facility] (31925) - 272.4/E78.5 - 07/27/2020  o CMP [facility] (03609) - 272.4/E78.5 - 07/27/2020  o Lipid Panel [facility] (13495) - 272.4/E78.5 - 07/27/2020  o Thyroid Profile (21165, 55136, THYII) - 272.4/E78.5 - 07/27/2020  o ACO-39: Current medications updated and reviewed () - - 07/27/2020  · Medications  o clobetasol 0.05 % topical cream   SIG: apply a thin layer to the affected area(s) by topical route 2 times per day   DISP: (1) 15 gm tube with 0 refills  Discontinued on 07/27/2020     o Medrol (Juan) 4 mg oral tablets,dose pack   SIG: take as directed   DISP: (1) 21 ct dose-pack with 0 refills  Discontinued on 07/27/2020     o Voltaren 1 % topical gel   SIG: apply  2 gram to the affected area(s) by topical routine once daily   DISP: (1) 100 gm tube with 0 refills  Discontinued on 07/27/2020     o Zithromax Z-Juan 250 mg oral tablet   SIG: take 2 tablets (500 mg) by oral route once daily for 1 day then 1 tablet (250 mg) by oral route once daily for 4 days   DISP: (6) tablets with 0 refills  Discontinued on 07/27/2020     o Medications have been Reconciled  o Transition of Care or Provider Policy  · Instructions  o Advised that cheeses and other sources of dairy fats, animal fats, fast food, and the extras (candy, pastries, pies, doughnuts and cookies) all contain LDL raising nutrients. Advised to increase fruits, vegetables, whole grains, and to monitor portion sizes.   o Patient was educated/instructed on their diagnosis, treatment and medications prior to discharge from the clinic today.  o Patient was instructed to exercise regularly.  o Patient instructed to seek medical attention urgently for new or worsening symptoms.  o Call the office with any concerns or questions.  · Disposition  o Return Visit Request in/on 6 months +/- 2 weeks ([id number]).            Electronically Signed by: Samantha Dash APRN -Author on July 27, 2020 08:41:58 AM

## 2021-05-13 NOTE — PROGRESS NOTES
Progress Note      Patient Name: Swapna Tay   Patient ID: [id number]   Sex: Female   YOB: 1965    Primary Care Provider: Samantha CROWE   Referring Provider: Samantha CROWE    Visit Date: 2020    Provider: Garrick Parra MD   Location: [facility]   Location Address: [address] [place] [address]  [id number]   Location Phone: [phone]          Chief Complaint  · left wrist pain      History Of Present Illness  Swapna Tay is a 54 year old /Black female who presents today to [facility].      The patient presents here today for follow up evaluation of her left hand and wrist pain. She had an EMG in [place] that revealed carpal tunnel a few years ago. She also has a knot on her hand. We previously ordered an MRI of her left hand to evaluate the knot and she is here today for those results.  She is still complaining of numbness and tingling. She has not had her updated EMG/NCV. She has took Ultram in the past without relief.       Past Medical History  Allergic rhinitis; Carpal tunnel syndrome, bilateral; Cataract; Contact dermatitis; Eczema; Gastric Ulcer; Genital herpes; High cholesterol; Hyperlipidemia; Hypertension; Menopause; Pap smear for cervical cancer screening; Plantar fasciitis; Restless leg syndrome; Screening Mammogram; Seasonal allergies         Past Surgical History  *Other Surgery; ; Colonoscopy; Hernia; Hernia Repair         Medication List  Claritin 10 mg oral tablet; Crestor 10 mg oral tablet; fluticasone propionate 50 mcg/actuation nasal spray,suspension; telmisartan 20 mg oral tablet; triamcinolone acetonide 0.1 % topical cream; valacyclovir 500 mg oral tablet; venlafaxine 75 mg oral tablet; Vitamin C 1,000 mg oral tablet; Vitamin D3 400 unit oral tablet         Allergy List  NO KNOWN DRUG ALLERGIES; Lactose intolerant; NO KNOWN DRUG ALLERGIES       Allergies Reconciled  Family Medical  "History  Stroke; Cancer, Unspecified; Diabetes, unspecified type; Colon Cancer; Ovarian Cancer, Family History; Family history of Arthritis         Social History  Alcohol (Never); Alcohol Use (Never); Exercises regularly; lives with children; lives with spouse; ; .; Recreational Drug Use (Never); Tobacco (Never); Working         Review of Systems  · Constitutional  o Denies  o : fever, chills, weight loss  · Cardiovascular  o Denies  o : chest pain, shortness of breath  · Gastrointestinal  o Denies  o : liver disease, heartburn, nausea, blood in stools  · Genitourinary  o Denies  o : painful urination, blood in urine  · Integument  o Denies  o : rash, itching  · Neurologic  o Denies  o : headache, weakness, loss of consciousness  · Musculoskeletal  o Denies  o : painful, swollen joints  · Psychiatric  o Denies  o : drug/alcohol addiction, anxiety, depression      Vitals  Date Time BP Position Site L\R Cuff Size HR RR TEMP (F) WT  HT  BMI kg/m2 BSA m2 O2 Sat FR L/min FiO2        10/13/2020 03:44 PM      65 - R   179lbs 0oz 5'  4.5\" 30.25 1.92 97 %            Physical Examination  · Constitutional  o Appearance  o : well developed, well-nourished, no obvious deformities present  · Head and Face  o Head  o :   § Inspection  § : normocephalic  o Face  o :   § Inspection  § : no facial lesions  · Eyes  o Conjunctivae  o : conjunctivae normal  o Sclerae  o : sclerae white  · Ears, Nose, Mouth and Throat  o Ears  o :   § External Ears  § : appearance within normal limits  § Hearing  § : intact  o Nose  o :   § External Nose  § : appearance normal  · Neck  o Inspection/Palpation  o : normal appearance  o Range of Motion  o : full range of motion  · Respiratory  o Respiratory Effort  o : breathing unlabored  o Inspection of Chest  o : normal appearance  o Auscultation of Lungs  o : no audible wheezing or rales  · Cardiovascular  o Heart  o : regular rate  · Gastrointestinal  o Abdominal Examination  o : soft " and non-tender  · Skin and Subcutaneous Tissue  o General Inspection  o : intact, no rashes  · Psychiatric  o General  o : Alert and oriented x3  o Judgement and Insight  o : judgment and insight intact  o Mood and Affect  o : mood normal, affect appropriate  · Left Wrist  o Inspection  o : Positive compression Negative tinels. No atrophy; strength 5/5 AIN, PIN, ulnar nerve. Tender over distal ulnar FCU nerve. Pain with wrist Extension. Extension 80, Flexion 70.   · Imaging  o Imaging  o : [facility] MRI 10/2020: 1. No evidence for ligament or tendon derangement. 2. Mild to moderate changes of arthropathy most consistent with osteoarthritis. The findings are most pronounced at the 1st carpometacarpal joint as well as at the carpal carpal articulation of the triquetral and pisiform bones.           Assessment  · Left wrist pain     719.43/M25.532  · Wrist tendonitis     727.05/M77.8  · Carpal tunnel syndrome of left wrist     354.0/G56.02      Plan  · Medications  o Medications have been Reconciled  o Transition of Care or Provider Policy  · Instructions  o MRI reviewed by Dr. Parra.  o Reviewed the patient's Past Medical, Social, and Family history as well as the ROS at today's visit, no changes.  o Call or return if worsening symptoms.  o Follow Up after EMG.  o The above service was scribed by Shanon Lundy on my behalf and I attest to the accuracy of the note. jsb  o Discussed treatment options with the patient. Plan to get the updated bilateral EMG/NCV to reevaluate her carpal tunnel. Follow up after EMG/NCV for results.   o Electronically Identified Patient Education Materials Provided Electronically  · Referrals  o ID: [id number] Date: 09/17/2020 Type: Inbound  Specialty: Orthopedic Surgery            Electronically Signed by: Shanon Lundy MA -Author on October 14, 2020 08:39:49 AM  Electronically Co-signed by: Garrick Parra MD -Reviewer on October 14, 2020 08:36:40 PM

## 2021-05-14 VITALS
WEIGHT: 180.37 LBS | OXYGEN SATURATION: 100 % | SYSTOLIC BLOOD PRESSURE: 138 MMHG | BODY MASS INDEX: 30.79 KG/M2 | DIASTOLIC BLOOD PRESSURE: 98 MMHG | HEIGHT: 64 IN | HEART RATE: 68 BPM

## 2021-05-14 VITALS — BODY MASS INDEX: 29.56 KG/M2 | WEIGHT: 173.12 LBS | HEIGHT: 64 IN

## 2021-05-14 VITALS — OXYGEN SATURATION: 97 % | HEIGHT: 64 IN | BODY MASS INDEX: 30.56 KG/M2 | WEIGHT: 179 LBS | HEART RATE: 65 BPM

## 2021-05-14 NOTE — PROGRESS NOTES
Progress Note      Patient Name: Swapna Tay   Patient ID: [id number]   Sex: Female   YOB: 1965    Primary Care Provider: Samantha CROWE   Referring Provider: Samantha CROWE    Visit Date: 2020    Provider: AMRITA Givens   [place] [facility] Address[place] [address] [place] [address], [address]  [place], [place]  [id number]   Location Phone: [phone]          Chief Complaint  · covid test      History Of Present Illness  Video Conferencing Visit  Swapna Tay is a 55 year old /Black female who is presenting for evaluation via video conferencing via [employer]. Verbal consent obtained before beginning visit.   The following staff were present during this visit: Liya Dallas MA   Swapna Tay is a 55 year old /Black female who presents for evaluation and treatment of:      Pt is requesting to get covid testing. She is not currently having S/O but works in a  and wants to be checked before the break.           Past Medical History  Disease Name Date Onset Notes   Allergic rhinitis --  --    Carpal tunnel syndrome, bilateral --  --    Cataract --  --    Contact dermatitis --  --    Eczema --  --    Gastric Ulcer --  --    Genital herpes --  --    High cholesterol --  --    Hyperlipidemia 2018 --    Hypertension --  --    Menopause --  --    Pap smear for cervical cancer screening 1/15/2020 Normal repeat 5 yrs.   Plantar fasciitis --  --    Restless leg syndrome --  --    Screening Mammogram 10/2018 --    Seasonal allergies --  --          Past Surgical History  Procedure Name Date Notes   *Other Surgery  [facility] [place] Uterine ballon therapy- Dr. Douglas/ [place]     --    Colonoscopy  --    Hernia --  --    Hernia Repair ,  --          Medication List  Name Date Started Instructions   Claritin 10 mg oral tablet 2020 take 1 tablet (10 mg) by oral route  once daily for 90 days   Crestor 10 mg oral tablet 06/01/2020 take 1 tablet (10 mg) by oral route once daily at bedtime for 90 days   fluticasone propionate 50 mcg/actuation nasal spray,suspension 07/15/2019 spray 1 - 2 sprays (50 - 100 mcg) in each nostril by intranasal route once daily as needed for 90 days   telmisartan 20 mg oral tablet 08/24/2020 take 1 tablet (20 mg) by oral route once daily   triamcinolone acetonide 0.1 % topical cream 03/16/2020 apply a thin layer to the affected area(s) by topical route 2 times per day   valacyclovir 500 mg oral tablet 07/24/2020 take 1 tablet (500 mg) by oral route once daily for 30 days   venlafaxine 75 mg oral tablet 06/01/2020 take 1 tablet by oral route once a day (at bedtime) for 90 days   Vitamin C 1,000 mg oral tablet  --    Vitamin D3 400 unit oral tablet  --          Allergy List  Allergen Name Date Reaction Notes   NO KNOWN DRUG ALLERGIES --  --  --    Lactose intolerant --  --  Milk and cheese   NO KNOWN DRUG ALLERGIES --  --  --        Allergies Reconciled  Family Medical History  Disease Name Relative/Age Notes   Stroke Mother/   Mother   Cancer, Unspecified Brother/  Sister/   Sister; Brother   Diabetes, unspecified type Father/   Father   Colon Cancer Brother/   --    Ovarian Cancer, Family History Sister/   --    Family history of Arthritis Father/  Mother/  Sister/   Mother; Father; Sister         Social History  Finding Status Start/Stop Quantity Notes   Alcohol Never --/-- --  --    Alcohol Use Never --/-- --  does not drink   Exercises regularly --  --/-- --  Walking 1 mile daily   lives with children --  --/-- --  --    lives with spouse --  --/-- --  --     --  --/-- --  --    . --  --/-- --  --    Recreational Drug Use Never --/-- --  no   Tobacco Never --/-- --  never smoker   Working --  --/-- --  --          Immunizations  NameDate Admin Mfg Trade Name Lot Number Route Inj VIS Given VIS Publication   Xlohukqic74/07/2018 [facility] Fluzone  Quadrivalent [id number] IM LA 11/07/2018 08/07/2015   Comments: Patient tolerated well.   Tdap11/09/2018 [facility] BOOSTRIX [id number] IM RA 11/09/2018 02/24/2015   Comments: Patient tolerated well.         Review of Systems  · Constitutional  o Denies  o : fever, fatigue, weight loss, weight gain  · Cardiovascular  o Denies  o : lower extremity edema, claudication, chest pressure, palpitations  · Respiratory  o Denies  o : shortness of breath, wheezing, cough, hemoptysis, dyspnea on exertion  · Gastrointestinal  o Denies  o : nausea, vomiting, diarrhea, constipation, abdominal pain      Physical Examination  · Constitutional  o Appearance  o : well-nourished, well developed, alert, in no acute distress  · Neurologic  o Mental Status Examination  o :   § Orientation  § : grossly oriented to person, place and time  · Psychiatric  o Mood and Affect  o : mood normal, affect appropriate          Assessment  · Exposure to COVID-19 virus     V01.79/Z20.828  · Hyperlipidemia     272.4/E78.5  · Menopause     627.2/Z78.0  · Allergic rhinitis     477.9/J30.9  · Hypertension     401.9/I10      Plan  · Orders  o [facility] NCOV2 (send-out) (24024) - V01.79/Z20.828 - 12/21/2020  o ACO-39: Current medications updated and reviewed (, 1159F) - - 12/21/2020  · Medications  o Medications have been Reconciled  o Transition of Care or Provider Policy  · Instructions  o Advised that cheeses and other sources of dairy fats, animal fats, fast food, and the extras (candy, pastries, pies, doughnuts and cookies) all contain LDL raising nutrients. Advised to increase fruits, vegetables, whole grains, and to monitor portion sizes.   o Patient was educated/instructed on their diagnosis, treatment and medications prior to discharge from the clinic today.  o Patient instructed to seek medical attention urgently for new or worsening symptoms.  o Call the office with any concerns or questions.  o Discussed Covid-19 precautions including, but not  limited to, social distancing, avoid touching your face, and hand washing.   o pt to come by office to be covid tested  · Disposition  o Call or Return if symptoms worsen or persist.            Electronically Signed by: AMRITA Givens -Author on December 21, 2020 11:48:28 AM

## 2021-05-14 NOTE — PROGRESS NOTES
Progress Note      Patient Name: Swapna Tay   Patient ID: [id number]   Sex: Female   YOB: 1965    Primary Care Provider: Samantha CROWE   Referring Provider: Samantha CROWE    Visit Date: 2021    Provider: AMRITA Givens   Location: [facility]   Location Address: [address] [place] [address], [address]  [place], [place]  [id number]   Location Phone: [phone]          Chief Complaint  · throat pain      History Of Present Illness  Swapna Tay is a 55 year old /Black female who presents for evaluation and treatment of:      Pt is here for burning in her throat while eating or drinking for the last three days. She does note that she has sinus drainage.    Colonoscopy: 10/9/2018  labs:20, has pending order  Mammogram:21  pap: 1/15/20  Flu: declined           Past Medical History  Disease Name Date Onset Notes   Allergic rhinitis --  --    Carpal tunnel syndrome, bilateral --  --    Cataract --  --    Contact dermatitis --  --    Eczema --  --    Gastric Ulcer --  --    Genital herpes --  --    High cholesterol --  --    Hyperlipidemia 2018 --    Hypertension --  --    Menopause --  --    Pap smear for cervical cancer screening 1/15/2020 Normal repeat 5 yrs.   Plantar fasciitis --  --    Restless leg syndrome --  --    Screening Mammogram 21 WNL   Seasonal allergies --  --          Past Surgical History  Procedure Name Date Notes   *Other Surgery  [facility] Uterine ballon therapy- Dr. Douglas/ [place]     --    Colonoscopy  --    Hernia --  --    Hernia Repair ,  --          Medication List  Name Date Started Instructions   Claritin 10 mg oral tablet 2021 take 1 tablet (10 mg) by oral route once daily for 90 days   Crestor 10 mg oral tablet 2020 take 1 tablet (10 mg) by oral route once daily at bedtime for 90 days   fluticasone propionate 50 mcg/actuation nasal  spray,suspension 12/28/2020 spray 1 - 2 sprays (50 - 100 mcg) in each nostril by intranasal route once daily as needed for 90 days   telmisartan 20 mg oral tablet 08/24/2020 take 1 tablet (20 mg) by oral route once daily   triamcinolone acetonide 0.1 % topical cream 03/16/2020 apply a thin layer to the affected area(s) by topical route 2 times per day   valacyclovir 500 mg oral tablet 12/28/2020 take 1 tablet (500 mg) by oral route once daily for 30 days   venlafaxine 75 mg oral tablet 12/28/2020 take 1 tablet by oral route once a day (at bedtime) for 90 days   Vitamin C 1,000 mg oral tablet  --    Vitamin D3 400 unit oral tablet  --          Allergy List  Allergen Name Date Reaction Notes   NO KNOWN DRUG ALLERGIES --  --  --    Lactose intolerant --  --  Milk and cheese   NO KNOWN DRUG ALLERGIES --  --  --          Family Medical History  Disease Name Relative/Age Notes   Stroke Mother/   Mother   Cancer, Unspecified Brother/  Sister/   Sister; Brother   Diabetes, unspecified type Father/   Father   Colon Cancer Brother/   --    Ovarian Cancer, Family History Sister/   --    Family history of Arthritis Father/  Mother/  Sister/   Mother; Father; Sister         Social History  Finding Status Start/Stop Quantity Notes   Alcohol Never --/-- --  --    Alcohol Use Never --/-- --  does not drink   Exercises regularly --  --/-- --  Walking 1 mile daily   lives with children --  --/-- --  --    lives with spouse --  --/-- --  --     --  --/-- --  --    . --  --/-- --  --    Recreational Drug Use Never --/-- --  no   Tobacco Never --/-- --  never smoker   Working --  --/-- --  --          Immunizations  NameDate Admin Mfg Trade Name Lot Number Route Inj VIS Given VIS Publication   Gjzqasgag78/07/2018 [facility] Fluzone Quadrivalent [id number] IM LA 11/07/2018 08/07/2015   Comments: Patient tolerated well.   Tdap11/09/2018 [facility] BOOSTRIX [id number] IM RA 11/09/2018 02/24/2015   Comments: Patient tolerated well.         Review  "of Systems  · Constitutional  o Denies  o : fever, fatigue, weight loss, weight gain  · HENT  o Admits  o : postnasal drip, sore throat  · Cardiovascular  o Denies  o : lower extremity edema, claudication, chest pressure, palpitations  · Respiratory  o Denies  o : shortness of breath, wheezing, cough, hemoptysis, dyspnea on exertion  · Gastrointestinal  o Denies  o : nausea, vomiting, diarrhea, constipation, abdominal pain      Vitals  Date Time BP Position Site L\R Cuff Size HR RR TEMP (F) WT  HT  BMI kg/m2 BSA m2 O2 Sat FR L/min FiO2 HC       01/15/2020 09:04 /95 Sitting    91 - R  97.5 162lbs 6oz 5'  4.5\" 27.44 1.83 98 %      07/27/2020 08:31 /92 Sitting    71 - R   173lbs 0oz 5'  4.5\" 29.24 1.89 99 %      03/11/2021 03:38 /98 Sitting    68 - R   180lbs 6oz 5'  4.5\" 30.48 1.93 100 %            Physical Examination  · Constitutional  o Appearance  o : well-nourished, well developed, alert, in no acute distress  · Ears, Nose, Mouth and Throat  o Ears  o :   § External Ears  § : appearance within normal limits, no lesions present  § Otoscopic Examination  § : tympanic membrane appearance within normal limits bilaterally without perforations, mobility normal  o Nose  o :   § External Nose  § : normal stucture noted.  § Intranasal Exam  § : no swelling, reddness, turbs normal dimas.  o Oral Cavity  o :   § Oral Mucosa  § : oral mucosa normal without pallor or cyanosis  § Lips  § : lip appearance normal  § Teeth  § : normal dentition for age  § Gums  § : gums pink, non-swollen, no bleeding present  § Tongue  § : tongue appearance normal  § Palate  § : hard palate normal, soft palate appearance normal  o Throat  o :   § Oropharynx  § : no inflammation or lesions present, tonsils within normal limits  · Respiratory  o Respiratory Effort  o : breathing unlabored  o Auscultation of Lungs  o : normal breath sounds throughout  · Cardiovascular  o Heart  o :   § Auscultation of Heart  § : regular rate and " rhythm, no murmurs, gallops or rubs  § Palpation of Heart  § : normal apical impulse, no cardiac thrill present  o Peripheral Vascular System  o :   § Extremities  § : no cyanosis, clubbing or edema; less than 2 second refill noted  · Skin and Subcutaneous Tissue  o General Inspection  o : no rashes or lesions present, no areas of discoloration  · Neurologic  o Mental Status Examination  o :   § Orientation  § : grossly oriented to person, place and time  o Cranial Nerves  o : cranial nerves intact and symmetric throughout  · Psychiatric  o Mood and Affect  o : mood normal, affect appropriate, denies any SI/HI          Results  · In-Office Procedures  o Lab procedure  § IOP - Rapid Strep (00087)   § Beta Strep Gp A Culture: Negative   § Internal Control Verified?: Yes       Assessment  · Hyperlipidemia     272.4/E78.5  · Screening for depression     V79.0/Z13.89  · Throat burning     784.1/R07.0  · Throat pain     784.1/R07.0  · Allergic rhinitis     477.9/J30.9  · Hypertension     401.9/I10      Plan  · Orders  o ACO-18: Negative screen for clinical depression using a standardized tool () - V79.0/Z13.89 - 03/11/2021  o ACO-14: Influenza immunization was not administered for reasons documented [facility] () - - 03/11/2021  o ACO-39: Current medications updated and reviewed (, 1159F) - - 03/11/2021  o Throat culture and sensitivity (05610) - - 03/11/2021  · Medications  o Medications have been Reconciled  o Transition of Care or Provider Policy  · Instructions  o Advised that cheeses and other sources of dairy fats, animal fats, fast food, and the extras (candy, pastries, pies, doughnuts and cookies) all contain LDL raising nutrients. Advised to increase fruits, vegetables, whole grains, and to monitor portion sizes.   o Depression Screen completed and scanned into the EMR under the designated folder within the patient's documents.  o Today's PHQ-9 result is _4__  o The provider screening met the required  time of 15 minutes.  o Patient was educated/instructed on their diagnosis, treatment and medications prior to discharge from the clinic today.  o Patient instructed to seek medical attention urgently for new or worsening symptoms.  o Call the office with any concerns or questions.  · Disposition  o Call or Return if symptoms worsen or persist.            Electronically Signed by: AMRITA Givens -Author on March 11, 2021 04:11:21 PM

## 2021-05-14 NOTE — PROGRESS NOTES
Progress Note      Patient Name: Swapna Tay   Patient ID: [id number]   Sex: Female   YOB: 1965    Primary Care Provider: Samantha CROWE   Referring Provider: Samantha CROWE    Visit Date: 2021    Provider: AMRITA Givens   [place] [facility] [place]   [place] Address: [address] [place] [address], [address]  [place], [place]  [id number]   Location Phone: [phone]          Chief Complaint  · 6 month follow up      History Of Present Illness  Video Conferencing Visit  Swapna Tay is a 55 year old /Black female who is presenting for evaluation via video conferencing via [employer]. Verbal consent obtained before beginning visit.   The following staff were present during this visit: Liya Dallas MA   Swapna Tay is a 55 year old /Black female who presents for evaluation and treatment of:      Pt is needing her 6 month follow up. She is having HA, but feels it may be related to her not taking her medication. She needs a refill on her Claritin.     Pt is due for mammogram and labs.    Pt declined the flu vaccine.       Past Medical History  Disease Name Date Onset Notes   Allergic rhinitis --  --    Carpal tunnel syndrome, bilateral --  --    Cataract --  --    Contact dermatitis --  --    Eczema --  --    Gastric Ulcer --  --    Genital herpes --  --    High cholesterol --  --    Hyperlipidemia 2018 --    Hypertension --  --    Menopause --  --    Pap smear for cervical cancer screening 1/15/2020 Normal repeat 5 yrs.   Plantar fasciitis --  --    Restless leg syndrome --  --    Screening Mammogram 10/2018 --    Seasonal allergies --  --          Past Surgical History  Procedure Name Date Notes   *Other Surgery  [facility] Uterine ballon therapy- Dr. Douglas/ [facility] Gutierrez     --    Colonoscopy  --    Hernia --  --    Hernia Repair ,  --          Medication List  Name Date Started  Instructions   Claritin 10 mg oral tablet 01/27/2021 take 1 tablet (10 mg) by oral route once daily for 90 days   Crestor 10 mg oral tablet 06/01/2020 take 1 tablet (10 mg) by oral route once daily at bedtime for 90 days   fluticasone propionate 50 mcg/actuation nasal spray,suspension 12/28/2020 spray 1 - 2 sprays (50 - 100 mcg) in each nostril by intranasal route once daily as needed for 90 days   telmisartan 20 mg oral tablet 08/24/2020 take 1 tablet (20 mg) by oral route once daily   triamcinolone acetonide 0.1 % topical cream 03/16/2020 apply a thin layer to the affected area(s) by topical route 2 times per day   valacyclovir 500 mg oral tablet 12/28/2020 take 1 tablet (500 mg) by oral route once daily for 30 days   venlafaxine 75 mg oral tablet 12/28/2020 take 1 tablet by oral route once a day (at bedtime) for 90 days   Vitamin C 1,000 mg oral tablet  --    Vitamin D3 400 unit oral tablet  --          Allergy List  Allergen Name Date Reaction Notes   NO KNOWN DRUG ALLERGIES --  --  --    Lactose intolerant --  --  Milk and cheese   NO KNOWN DRUG ALLERGIES --  --  --        Allergies Reconciled  Family Medical History  Disease Name Relative/Age Notes   Stroke Mother/   Mother   Cancer, Unspecified Brother/  Sister/   Sister; Brother   Diabetes, unspecified type Father/   Father   Colon Cancer Brother/   --    Ovarian Cancer, Family History Sister/   --    Family history of Arthritis Father/  Mother/  Sister/   Mother; Father; Sister         Social History  Finding Status Start/Stop Quantity Notes   Alcohol Never --/-- --  --    Alcohol Use Never --/-- --  does not drink   Exercises regularly --  --/-- --  Walking 1 mile daily   lives with children --  --/-- --  --    lives with spouse --  --/-- --  --     --  --/-- --  --    . --  --/-- --  --    Recreational Drug Use Never --/-- --  no   Tobacco Never --/-- --  never smoker   Working --  --/-- --  --          Immunizations  NameDate [facility]  Trade Name Lot Number Route Inj VIS Given VIS Publication   [id number]/07/2018 [facility] Fluzone Quadrivalent [id number] IM LA 11/07/2018 08/07/2015   Comments: Patient tolerated well.   Tdap11/09/2018 SKB BOOSTRIX [id number] IM RA 11/09/2018 02/24/2015   Comments: Patient tolerated well.         Review of Systems  · Constitutional  o Denies  o : fever, fatigue, weight loss, weight gain  · HENT  o Admits  o : headaches  · Breasts  o Denies  o : lumps, tenderness, swelling, nipple discharge  · Cardiovascular  o Denies  o : lower extremity edema, claudication, chest pressure, palpitations  · Respiratory  o Denies  o : shortness of breath, wheezing, cough, hemoptysis, dyspnea on exertion  · Gastrointestinal  o Denies  o : nausea, vomiting, diarrhea, constipation, abdominal pain  · Psychiatric  o Admits  o : anxiety, depression      Physical Examination  · Constitutional  o Appearance  o : well-nourished, well developed, alert, in no acute distress  · Neurologic  o Mental Status Examination  o :   § Orientation  § : grossly oriented to person, place and time  · Psychiatric  o Mood and Affect  o : mood normal, affect appropriate          Assessment  · Anxiety disorder     300.00/F41.9  · Depression     311/F32.9  · Hyperlipidemia     272.4/E78.5  · Visit for screening mammogram     V76.12/Z12.31  · Menopause     627.2/Z78.0  · Allergic rhinitis     477.9/J30.9  · Genital herpes     054.10/A60.00  · Hypertension     401.9/I10  · Restless leg syndrome     333.94/G25.81  · Screening for thyroid disorder     V77.0/Z13.29  · Routine lab draw     V72.60/Z01.89    Problems Reconciled  Plan  · Orders  o Screening Mammography; Bilateral 3D (96085, 77824, ) - V76.12/Z12.31 - 02/19/2021   LEIGHANN 2/19/2020 at 530  o CBC with Auto Diff [facility] (23504) - 401.9/I10 - 01/27/2021  o CMP [facility] (77711) - 401.9/I10 - 01/27/2021  o Lipid Panel [facility] (77433) - 272.4/E78.5 - 01/27/2021  o Thyroid Profile (48734, 32600, THYII) - V77.0/Z13.29 -  "01/27/2021  o ACO-14: Influenza immunization was not administered for reasons documented [facility] () - - 01/27/2021   declined  o ACO-19: Colorectal cancer screening results documented and reviewed (3017F) - - 01/27/2021  o ACO-39: Current medications updated and reviewed (1159F, ) - - 01/27/2021  · Medications  o Claritin 10 mg oral tablet   SIG: take 1 tablet (10 mg) by oral route once daily for 90 days   DISP: (90) Tablet with 1 refills  Refilled on 01/27/2021     o Medications have been Reconciled  o Transition of Care or Provider Policy  · Instructions  o Patient agrees to a \"No Self Harm\" contract. Patient will either call , 911, ER, [facility], [facility].  o Patient agrees to a \"No Self Harm\" contract. Patient will either call , [facility], ER, [facility], [facility].  o Advised that cheeses and other sources of dairy fats, animal fats, fast food, and the extras (candy, pastries, pies, doughnuts and cookies) all contain LDL raising nutrients. Advised to increase fruits, vegetables, whole grains, and to monitor portion sizes.   o Patient was educated/instructed on their diagnosis, treatment and medications prior to discharge from the clinic today.  o Patient instructed to seek medical attention urgently for new or worsening symptoms.  o Call the office with any concerns or questions.  · Disposition  o Call or Return if symptoms worsen or persist.  o Return Visit Request in/on 6 months +/- 2 weeks (20729).            Electronically Signed by: AMRITA Givens -Author on January 27, 2021 08:37:22 AM  "

## 2021-05-15 VITALS
HEART RATE: 71 BPM | WEIGHT: 173 LBS | OXYGEN SATURATION: 99 % | SYSTOLIC BLOOD PRESSURE: 134 MMHG | DIASTOLIC BLOOD PRESSURE: 92 MMHG | HEIGHT: 64 IN | BODY MASS INDEX: 29.53 KG/M2

## 2021-05-15 VITALS
SYSTOLIC BLOOD PRESSURE: 136 MMHG | WEIGHT: 179 LBS | HEART RATE: 79 BPM | BODY MASS INDEX: 30.56 KG/M2 | HEIGHT: 64 IN | DIASTOLIC BLOOD PRESSURE: 92 MMHG | OXYGEN SATURATION: 94 %

## 2021-05-15 VITALS
SYSTOLIC BLOOD PRESSURE: 140 MMHG | TEMPERATURE: 98.2 F | WEIGHT: 159.37 LBS | DIASTOLIC BLOOD PRESSURE: 95 MMHG | OXYGEN SATURATION: 99 % | HEART RATE: 71 BPM | BODY MASS INDEX: 27.21 KG/M2 | HEIGHT: 64 IN

## 2021-05-15 VITALS
DIASTOLIC BLOOD PRESSURE: 81 MMHG | HEART RATE: 77 BPM | HEIGHT: 64 IN | SYSTOLIC BLOOD PRESSURE: 127 MMHG | BODY MASS INDEX: 30.94 KG/M2 | WEIGHT: 181.25 LBS

## 2021-05-15 VITALS
BODY MASS INDEX: 27.72 KG/M2 | TEMPERATURE: 97.5 F | HEIGHT: 64 IN | WEIGHT: 162.37 LBS | OXYGEN SATURATION: 98 % | HEART RATE: 91 BPM | DIASTOLIC BLOOD PRESSURE: 95 MMHG | SYSTOLIC BLOOD PRESSURE: 139 MMHG

## 2021-05-15 VITALS
HEIGHT: 64 IN | SYSTOLIC BLOOD PRESSURE: 126 MMHG | OXYGEN SATURATION: 97 % | DIASTOLIC BLOOD PRESSURE: 84 MMHG | BODY MASS INDEX: 30.28 KG/M2 | HEART RATE: 70 BPM | WEIGHT: 177.37 LBS

## 2021-05-16 VITALS
HEART RATE: 66 BPM | SYSTOLIC BLOOD PRESSURE: 139 MMHG | HEIGHT: 64 IN | WEIGHT: 182.25 LBS | BODY MASS INDEX: 31.12 KG/M2 | DIASTOLIC BLOOD PRESSURE: 96 MMHG

## 2021-05-17 ENCOUNTER — HOSPITAL ENCOUNTER (OUTPATIENT)
Dept: GENERAL RADIOLOGY | Facility: HOSPITAL | Age: 56
Discharge: HOME | End: 2021-05-17
Attending: NURSE PRACTITIONER

## 2021-06-20 DIAGNOSIS — F33.0 MAJOR DEPRESSIVE DISORDER, RECURRENT EPISODE, MILD DEGREE (HCC): ICD-10-CM

## 2021-06-20 DIAGNOSIS — B02.8 HERPES ZOSTER WITH COMPLICATION: Primary | ICD-10-CM

## 2021-06-21 PROBLEM — K25.9 GASTRIC ULCER: Status: ACTIVE | Noted: 2021-06-21

## 2021-06-21 PROBLEM — L25.9 CONTACT DERMATITIS: Status: ACTIVE | Noted: 2021-06-21

## 2021-06-21 PROBLEM — H26.9 CATARACT: Status: ACTIVE | Noted: 2021-06-21

## 2021-06-21 PROBLEM — M72.2 PLANTAR FASCIITIS: Status: ACTIVE | Noted: 2021-06-21

## 2021-06-21 PROBLEM — E78.5 HYPERLIPIDEMIA: Status: ACTIVE | Noted: 2018-08-30

## 2021-06-21 PROBLEM — A60.00 GENITAL HERPES: Status: ACTIVE | Noted: 2021-06-21

## 2021-06-21 PROBLEM — I10 HYPERTENSION: Status: ACTIVE | Noted: 2021-06-21

## 2021-06-21 PROBLEM — Z78.0 MENOPAUSE: Status: ACTIVE | Noted: 2021-06-21

## 2021-06-21 PROBLEM — J30.9 ALLERGIC RHINITIS: Status: ACTIVE | Noted: 2021-06-21

## 2021-06-21 PROBLEM — G56.03 CARPAL TUNNEL SYNDROME, BILATERAL: Status: ACTIVE | Noted: 2021-06-21

## 2021-06-21 PROBLEM — L30.9 ECZEMA: Status: ACTIVE | Noted: 2021-06-21

## 2021-06-21 PROBLEM — G25.81 RESTLESS LEG SYNDROME: Status: ACTIVE | Noted: 2021-06-21

## 2021-06-21 RX ORDER — VALACYCLOVIR HYDROCHLORIDE 500 MG/1
TABLET, FILM COATED ORAL
Qty: 30 TABLET | Refills: 1 | Status: SHIPPED | OUTPATIENT
Start: 2021-06-21 | End: 2021-08-19

## 2021-06-21 RX ORDER — OMEGA-3S/DHA/EPA/FISH OIL/D3 300MG-1000
CAPSULE ORAL
COMMUNITY
End: 2021-12-20

## 2021-06-21 RX ORDER — VENLAFAXINE 75 MG/1
75 TABLET ORAL
COMMUNITY
Start: 2021-03-23 | End: 2021-06-21 | Stop reason: SDUPTHER

## 2021-06-21 RX ORDER — VENLAFAXINE 75 MG/1
TABLET ORAL
Qty: 90 TABLET | Refills: 1 | Status: SHIPPED | OUTPATIENT
Start: 2021-06-21 | End: 2021-12-13

## 2021-06-21 RX ORDER — FLUTICASONE PROPIONATE 50 MCG
SPRAY, SUSPENSION (ML) NASAL
COMMUNITY
Start: 2021-05-21 | End: 2021-07-20

## 2021-06-21 RX ORDER — VALACYCLOVIR HYDROCHLORIDE 500 MG/1
TABLET, FILM COATED ORAL
COMMUNITY
Start: 2021-05-21 | End: 2021-06-21 | Stop reason: SDUPTHER

## 2021-06-21 RX ORDER — TELMISARTAN 20 MG/1
20 TABLET ORAL DAILY
COMMUNITY
Start: 2021-03-23 | End: 2021-12-21

## 2021-06-21 RX ORDER — ROSUVASTATIN CALCIUM 10 MG/1
TABLET, COATED ORAL
COMMUNITY
Start: 2021-04-15 | End: 2021-07-26 | Stop reason: SDUPTHER

## 2021-06-21 RX ORDER — IBUPROFEN 800 MG/1
800 TABLET ORAL EVERY 8 HOURS PRN
COMMUNITY
Start: 2021-05-19 | End: 2022-01-20

## 2021-07-13 ENCOUNTER — OFFICE VISIT (OUTPATIENT)
Dept: ORTHOPEDIC SURGERY | Facility: CLINIC | Age: 56
End: 2021-07-13

## 2021-07-13 VITALS — BODY MASS INDEX: 31.69 KG/M2 | HEIGHT: 64 IN | WEIGHT: 185.6 LBS | HEART RATE: 88 BPM | OXYGEN SATURATION: 94 %

## 2021-07-13 DIAGNOSIS — M25.511 RIGHT SHOULDER PAIN, UNSPECIFIED CHRONICITY: Primary | ICD-10-CM

## 2021-07-13 DIAGNOSIS — M75.111 INCOMPLETE TEAR OF RIGHT ROTATOR CUFF, UNSPECIFIED WHETHER TRAUMATIC: ICD-10-CM

## 2021-07-13 DIAGNOSIS — M75.51 BURSITIS OF RIGHT SHOULDER: ICD-10-CM

## 2021-07-13 PROCEDURE — 20610 DRAIN/INJ JOINT/BURSA W/O US: CPT | Performed by: ORTHOPAEDIC SURGERY

## 2021-07-13 PROCEDURE — 99214 OFFICE O/P EST MOD 30 MIN: CPT | Performed by: ORTHOPAEDIC SURGERY

## 2021-07-13 RX ORDER — DICLOFENAC SODIUM 75 MG/1
75 TABLET, DELAYED RELEASE ORAL 2 TIMES DAILY
Qty: 60 TABLET | Refills: 2 | Status: SHIPPED | OUTPATIENT
Start: 2021-07-13 | End: 2021-09-27

## 2021-07-13 RX ADMIN — METHYLPREDNISOLONE ACETATE 80 MG: 80 INJECTION, SUSPENSION INTRA-ARTICULAR; INTRALESIONAL; INTRAMUSCULAR; SOFT TISSUE at 11:54

## 2021-07-13 RX ADMIN — LIDOCAINE HYDROCHLORIDE 9 ML: 10 INJECTION, SOLUTION INFILTRATION; PERINEURAL at 11:54

## 2021-07-13 NOTE — PROGRESS NOTES
"Chief Complaint  Pain of the Right Shoulder     Subjective      Swapna Tay presents to [facility] for evaluation of her right shoulder. The patient reports about 3 months ago she started having pain to her shoulder. She reports about 3 weeks prior to pain in the shoulder she fell down stairs and tried catching herself. She reports pain at night. She reports the pain is an aching pain. She admits to decreased ROM to the shoulder and pain with certain ROM. She has been taking ibuprofen 800 mg. She has no other complaints.     Allergies   Allergen Reactions   • Lactose Intolerance (Gi) Unknown - Low Severity        Social History     Socioeconomic History   • Marital status:      Spouse name: Not on file   • Number of children: Not on file   • Years of education: Not on file   • Highest education level: Not on file   Tobacco Use   • Smoking status: Never Smoker   • Smokeless tobacco: Never Used   Substance and Sexual Activity   • Alcohol use: Never   • Drug use: Never        Review of Systems     Objective   Vital Signs:   Pulse 88   Ht 162.6 cm (64\")   Wt 84.2 kg (185 lb 9.6 oz)   SpO2 94%   BMI 31.86 kg/m²       Physical Exam  Constitutional:       Appearance: Normal appearance. He is well-developed and normal weight.   HENT:      Head: Normocephalic.      Right Ear: Hearing and external ear normal.      Left Ear: Hearing and external ear normal.      Nose: Nose normal.   Eyes:      Conjunctiva/sclera: Conjunctivae normal.   Cardiovascular:      Rate and Rhythm: Normal rate.   Pulmonary:      Effort: Pulmonary effort is normal.      Breath sounds: No wheezing or rales.   Abdominal:      Palpations: Abdomen is soft.      Tenderness: There is no abdominal tenderness.   Musculoskeletal:      Cervical back: Normal range of motion.   Skin:     Findings: No rash.   Neurological:      Mental Status: He is alert and oriented to person, place, and time.   Psychiatric:         Mood " and Affect: Mood and affect normal.         Judgment: Judgment normal.       Ortho Exam      Right shoulder- No skin discoloration, atrophy or swelling. Non-tender. Forward elevation 175. Abduction 100. External Rotation  75. Internal rotation 60. 4+ supraspinatus strength. 5/5 infraspinatus  And infrared subscap. Internal rotation to T12. Negative lift off. Negative cross arm adduction. Positive impingement signs. Negative Shearer.     Large Joint Arthrocentesis: R subacromial bursa  Date/Time: 7/13/2021 11:54 AM  Consent given by: patient  Site marked: site marked  Timeout: Immediately prior to procedure a time out was called to verify the correct patient, procedure, equipment, support staff and site/side marked as required   Supporting Documentation  Indications: pain   Procedure Details  Location: shoulder - R subacromial bursa  Needle gauge: 21 G.  Medications administered: 9 mL lidocaine 1 %; 80 mg methylPREDNISolone acetate 80 MG/ML  Patient tolerance: patient tolerated the procedure well with no immediate complications          X-Ray Report:  Right shoulder(s) X-Ray  Indication: Evaluation of right shoulder pain  AP and Lateral view(s)  Findings: moderate degenerative changes to acromioclavicular joint. No acute fracture. Moderate degenerative changes to the shoulder.   Prior studies available for comparison: yes           Imaging Results (Most Recent)     Procedure Component Value Units Date/Time    XR Scapula Right [[id number]] Resulted: 07/13/21 0951     Updated: 07/13/21 0952         MRI [facility] 5/2021-   1. Mild acromioclavicular osteoarthritis    2. Subdeltoid/subacromial bursitis    3. Small partial thickness bursal surface tear of the distal supraspinatus tendon.    4. Degenerative changes of the anteroinferior glenoid labrum        Result Review :       No results found.           Assessment and Plan     DX: Partial rotator cuff tear, right shoulder bursitis    Discussed the treatment plan with the  patient.  Discussed the risks and benefits of a steroid injection in the right shoulder. The patient expressed understanding and wished to proceed. She tolerated the injection well. Order for physical therapy given today. Prescription for diclofenac given today.     Call or return if worsening symptoms.    Follow Up     Follow up in 6 weeks to recheck.       Patient was given instructions and counseling regarding her condition or for health maintenance advice. Please see specific information pulled into the AVS if appropriate.     Scribed for Garrick Parra MD by Shanon Lundy.  07/13/21   09:57 EDT    I have personally performed the services described in this document as scribed by the above individual and it is both accurate and complete.  Garrick Parra MD 07/13/21  09:57 EDT

## 2021-07-14 RX ORDER — METHYLPREDNISOLONE ACETATE 80 MG/ML
80 INJECTION, SUSPENSION INTRA-ARTICULAR; INTRALESIONAL; INTRAMUSCULAR; SOFT TISSUE
Status: COMPLETED | OUTPATIENT
Start: 2021-07-13 | End: 2021-07-13

## 2021-07-14 RX ORDER — LIDOCAINE HYDROCHLORIDE 10 MG/ML
9 INJECTION, SOLUTION INFILTRATION; PERINEURAL
Status: COMPLETED | OUTPATIENT
Start: 2021-07-13 | End: 2021-07-13

## 2021-07-20 DIAGNOSIS — J30.9 ALLERGIC RHINITIS, UNSPECIFIED SEASONALITY, UNSPECIFIED TRIGGER: Primary | ICD-10-CM

## 2021-07-20 RX ORDER — FLUTICASONE PROPIONATE 50 MCG
SPRAY, SUSPENSION (ML) NASAL
Qty: 16 G | Refills: 1 | Status: SHIPPED | OUTPATIENT
Start: 2021-07-20

## 2021-07-26 DIAGNOSIS — E78.5 HYPERLIPIDEMIA, UNSPECIFIED HYPERLIPIDEMIA TYPE: Primary | ICD-10-CM

## 2021-07-26 RX ORDER — ROSUVASTATIN CALCIUM 10 MG/1
10 TABLET, COATED ORAL NIGHTLY
Qty: 90 TABLET | Refills: 1 | Status: SHIPPED | OUTPATIENT
Start: 2021-07-26 | End: 2022-01-20 | Stop reason: SDUPTHER

## 2021-07-26 NOTE — TELEPHONE ENCOUNTER
Caller: Swapna Tay    Relationship: Self    Best call back number: [phone]     Medication needed:   Requested Prescriptions     Pending Prescriptions Disp Refills   • rosuvastatin (CRESTOR) 10 MG tablet         When do you need the refill by: ASAP    What additional details did the patient provide when requesting the medication: PATIENT IS OUT OF MEDICATION    Does the patient have less than a 3 day supply:  [x] Yes  [] No    What is the patient's preferred pharmacy: [id number]  [place], [place] - [address] AT [facility] [phone] [facility] [phone]

## 2021-08-19 DIAGNOSIS — B02.8 HERPES ZOSTER WITH COMPLICATION: ICD-10-CM

## 2021-08-19 PROBLEM — Z12.4 PAP SMEAR FOR CERVICAL CANCER SCREENING: Status: ACTIVE | Noted: 2020-01-15

## 2021-08-19 RX ORDER — ACETAMINOPHEN AND CODEINE PHOSPHATE 300; 30 MG/1; MG/1
TABLET ORAL
COMMUNITY
Start: 2021-07-22 | End: 2021-09-27

## 2021-08-19 RX ORDER — ONDANSETRON 4 MG/1
TABLET, FILM COATED ORAL
COMMUNITY
Start: 2021-07-22 | End: 2021-09-27

## 2021-08-19 RX ORDER — VALACYCLOVIR HYDROCHLORIDE 500 MG/1
TABLET, FILM COATED ORAL
Qty: 30 TABLET | Refills: 1 | Status: SHIPPED | OUTPATIENT
Start: 2021-08-19 | End: 2021-10-18

## 2021-08-24 ENCOUNTER — TELEPHONE (OUTPATIENT)
Dept: FAMILY MEDICINE CLINIC | Facility: CLINIC | Age: 56
End: 2021-08-24

## 2021-08-24 NOTE — TELEPHONE ENCOUNTER
Caller: Swapna Tay    Relationship: Self    Best call back number: [phone]     What is the best time to reach you:ANY     Who are you requesting to speak with (clinical staff, provider,  specific staff member): CLINICAL    What was the call regarding: PATIENT STATES SHE HAS A PAINFUL KNOT UNDER HER LEFT ARMPIT AND WOULD LIKE TO DISCUSS TREATMENT OPTIONS    Do you require a callback: YES

## 2021-09-18 DIAGNOSIS — J30.9 ALLERGIC RHINITIS, UNSPECIFIED SEASONALITY, UNSPECIFIED TRIGGER: ICD-10-CM

## 2021-09-20 RX ORDER — FLUTICASONE PROPIONATE 50 MCG
SPRAY, SUSPENSION (ML) NASAL
Qty: 16 G | Refills: 1 | OUTPATIENT
Start: 2021-09-20

## 2021-09-20 RX ORDER — TELMISARTAN 20 MG/1
TABLET ORAL
Qty: 90 TABLET | OUTPATIENT
Start: 2021-09-20

## 2021-09-27 ENCOUNTER — OFFICE VISIT (OUTPATIENT)
Dept: FAMILY MEDICINE CLINIC | Facility: CLINIC | Age: 56
End: 2021-09-27

## 2021-09-27 VITALS
HEART RATE: 77 BPM | OXYGEN SATURATION: 98 % | TEMPERATURE: 97.8 F | DIASTOLIC BLOOD PRESSURE: 90 MMHG | WEIGHT: 185.6 LBS | BODY MASS INDEX: 31.69 KG/M2 | HEIGHT: 64 IN | SYSTOLIC BLOOD PRESSURE: 139 MMHG

## 2021-09-27 DIAGNOSIS — R09.81 NASAL CONGESTION: ICD-10-CM

## 2021-09-27 DIAGNOSIS — R05.9 COUGH: Primary | ICD-10-CM

## 2021-09-27 DIAGNOSIS — H65.93 BILATERAL SEROUS OTITIS MEDIA, UNSPECIFIED CHRONICITY: ICD-10-CM

## 2021-09-27 DIAGNOSIS — I10 HYPERTENSION, UNSPECIFIED TYPE: ICD-10-CM

## 2021-09-27 LAB — SARS-COV-2 N GENE RESP QL NAA+PROBE: NOT DETECTED

## 2021-09-27 PROCEDURE — 87635 SARS-COV-2 COVID-19 AMP PRB: CPT | Performed by: NURSE PRACTITIONER

## 2021-09-27 PROCEDURE — 99213 OFFICE O/P EST LOW 20 MIN: CPT | Performed by: NURSE PRACTITIONER

## 2021-09-27 RX ORDER — METHYLPREDNISOLONE 4 MG/1
TABLET ORAL
Qty: 1 EACH | Refills: 0 | Status: SHIPPED | OUTPATIENT
Start: 2021-09-27 | End: 2021-12-20

## 2021-09-27 RX ORDER — DEXTROMETHORPHAN HYDROBROMIDE AND PROMETHAZINE HYDROCHLORIDE 15; 6.25 MG/5ML; MG/5ML
5 SYRUP ORAL 4 TIMES DAILY PRN
Qty: 200 ML | Refills: 0 | Status: SHIPPED | OUTPATIENT
Start: 2021-09-27 | End: 2021-12-20

## 2021-09-27 NOTE — ASSESSMENT & PLAN NOTE
Hypertension is poorly controlled at present, discussed with patient that she needs to monitor her blood pressure at home and that if it is remaining above 130/80 she needs to follow-up with PCP for further discussion.  Patient verbalized understanding.

## 2021-09-27 NOTE — PROGRESS NOTES
Chief Complaint  Cough and Nasal Congestion    Subjective          Swapna Tay presents to [facility] for   History of Present Illness    Patient is here with complaints of a cough and nasal congestion since Saturday. Patient had some nausea and vomiting on Thursday but it resolved on Saturday. Patient does have some pressure in her sinus areas and behind her ears. Denies any fever, states cough is unproductive.     No known direct COVID exposure, pt is vaccinated.   Medical History  Past Medical History:   Diagnosis Date   • Allergic rhinitis    • Carpal tunnel syndrome, bilateral    • Cataract    • Contact dermatitis    • Eczema    • Gastric ulcer    • Genital herpes    • High cholesterol    • Hyperlipidemia 2018   • Hypertension    • Menopause    • Pap smear for cervical cancer screening 01/15/2020    normal repeat 5 yrs    • Plantar fasciitis    • Restless leg syndrome    • Screening mammogram, encounter for 2021    WNL   • Seasonal allergies      Surgical History  Past Surgical History:   Procedure Laterality Date   •  SECTION     • COLONOSCOPY     • HERNIA REPAIR  ,    • OTHER SURGICAL HISTORY      [facility] U[facility] Balloon therapy - Dr. Misael [place], [place]     Social History  Social History     Socioeconomic History   • Marital status:      Spouse name: Not on file   • Number of children: Not on file   • Years of education: Not on file   • Highest education level: Not on file   Tobacco Use   • Smoking status: Never Smoker   • Smokeless tobacco: Never Used   Vaping Use   • Vaping Use: Never used   Substance and Sexual Activity   • Alcohol use: Never   • Drug use: Never   • Sexual activity: Defer       Current Outpatient Medications:   •  ascorbic acid (VITAMIN C) 1000 MG tablet, , Disp: , Rfl:   •  cholecalciferol (VITAMIN D3) 10 MCG (400 UNIT) tablet, , Disp: , Rfl:   •  fluticasone (FLONASE) 50 MCG/ACT nasal spray,  "SHAKE LIQUID AND USE 1 TO 2 SPRAYS(50  MCG) IN EACH NOSTRIL EVERY DAY AS NEEDED, Disp: 16 g, Rfl: 1  •  ibuprofen (ADVIL,MOTRIN) 800 MG tablet, Take 800 mg by mouth Every 8 (Eight) Hours As Needed., Disp: , Rfl:   •  rosuvastatin (CRESTOR) 10 MG tablet, Take 1 tablet by mouth Every Night., Disp: 90 tablet, Rfl: 1  •  telmisartan (MICARDIS) 20 MG tablet, , Disp: , Rfl:   •  valACYclovir (VALTREX) 500 MG tablet, TAKE 1 TABLET(500 MG) BY MOUTH EVERY DAY, Disp: 30 tablet, Rfl: 1  •  venlafaxine (EFFEXOR) 75 MG tablet, TAKE 1 TABLET BY MOUTH EVERY DAY AT BEDTIME, Disp: 90 tablet, Rfl: 1  •  methylPREDNISolone (MEDROL) 4 MG dose pack, Take as directed on package instructions., Disp: 1 each, Rfl: 0  •  promethazine-dextromethorphan (PROMETHAZINE-DM) 6.25-15 MG/5ML syrup, Take 5 mL by mouth 4 (Four) Times a Day As Needed for Cough., Disp: 200 mL, Rfl: 0    Review of Systems     Objective     /90 (BP Location: Left arm, Patient Position: Sitting, Cuff Size: Adult)   Pulse 77   Temp 97.8 °F (36.6 °C)   Ht 162.6 cm (64\")   Wt 84.2 kg (185 lb 9.6 oz)   SpO2 98%   BMI 31.86 kg/m²     Body mass index is 31.86 kg/m².    Physical Exam  Vitals reviewed.   Constitutional:       Appearance: Normal appearance. She is well-developed.   HENT:      Head: Normocephalic and atraumatic.      Right Ear: Ear canal and external ear normal.      Left Ear: Ear canal and external ear normal.      Ears:      Comments: Fluid bilateral TMs     Nose: Congestion and rhinorrhea present.      Mouth/Throat:      Pharynx: No oropharyngeal exudate or posterior oropharyngeal erythema.   Eyes:      Conjunctiva/sclera: Conjunctivae normal.      Pupils: Pupils are equal, round, and reactive to light.   Cardiovascular:      Rate and Rhythm: Normal rate and regular rhythm.      Heart sounds: No murmur heard.   No friction rub. No gallop.    Pulmonary:      Effort: Pulmonary effort is normal.      Breath sounds: Normal breath sounds. No wheezing " or rhonchi.   Abdominal:      General: Bowel sounds are normal. There is no distension.      Palpations: Abdomen is soft.      Tenderness: There is no abdominal tenderness.   Skin:     General: Skin is warm and dry.   Neurological:      Mental Status: She is alert and oriented to person, place, and time.      Cranial Nerves: No cranial nerve deficit.   Psychiatric:         Mood and Affect: Mood and affect normal.         Behavior: Behavior normal.         Thought Content: Thought content normal.         Judgment: Judgment normal.         Result Review :     The following data was reviewed by: AMRITA Lowe on 09/27/2021:                         Assessment:  Diagnoses and all orders for this visit:    1. Cough (Primary)  -     COVID-19,CEPHEID/HEATHER/BDMAX,COR/DUANE/PAD/MACY IN-HOUSE(OR EMERGENT/ADD-ON),NP SWAB IN TRANSPORT MEDIA 3-4 HR TAT, RT-PCR - Swab, Nasopharynx; Future  -     COVID-19,CEPHEID/HEATHER/BDMAX,COR/DUANE/PAD/MACY IN-HOUSE(OR EMERGENT/ADD-ON),NP SWAB IN TRANSPORT MEDIA 3-4 HR TAT, RT-PCR - Swab, Nasopharynx  -     promethazine-dextromethorphan (PROMETHAZINE-DM) 6.25-15 MG/5ML syrup; Take 5 mL by mouth 4 (Four) Times a Day As Needed for Cough.  Dispense: 200 mL; Refill: 0    2. Nasal congestion  -     COVID-19,CEPHEID/HEATHER/BDMAX,COR/DUANE/PAD/MACY IN-HOUSE(OR EMERGENT/ADD-ON),NP SWAB IN TRANSPORT MEDIA 3-4 HR TAT, RT-PCR - Swab, Nasopharynx; Future  -     COVID-19,CEPHEID/HEATHER/BDMAX,COR/DUANE/PAD/MACY IN-HOUSE(OR EMERGENT/ADD-ON),NP SWAB IN TRANSPORT MEDIA 3-4 HR TAT, RT-PCR - Swab, Nasopharynx    3. Hypertension, unspecified type  Assessment & Plan:  Hypertension is poorly controlled at present, discussed with patient that she needs to monitor her blood pressure at home and that if it is remaining above 130/80 she needs to follow-up with PCP for further discussion.  Patient verbalized understanding.      4. Bilateral serous otitis media, unspecified chronicity  -     methylPREDNISolone (MEDROL) 4 MG dose  pack; Take as directed on package instructions.  Dispense: 1 each; Refill: 0    Patient is self quarantine pending Results, Any Worsening Symptoms or Significant Shortness of Breath Patient Is Seek Immediate Reevaluation.          Follow Up     No follow-ups on file.    Patient was given instructions and counseling regarding her condition or for health maintenance advice. Please see specific information pulled into the AVS if appropriate.     Linda Valero, AMRITA  09/27/2021

## 2021-09-28 ENCOUNTER — TELEPHONE (OUTPATIENT)
Dept: FAMILY MEDICINE CLINIC | Facility: CLINIC | Age: 56
End: 2021-09-28

## 2021-10-18 DIAGNOSIS — B02.8 HERPES ZOSTER WITH COMPLICATION: ICD-10-CM

## 2021-10-18 RX ORDER — VALACYCLOVIR HYDROCHLORIDE 500 MG/1
TABLET, FILM COATED ORAL
Qty: 30 TABLET | Refills: 0 | Status: SHIPPED | OUTPATIENT
Start: 2021-10-18 | End: 2022-01-20 | Stop reason: SDUPTHER

## 2021-11-09 ENCOUNTER — TELEPHONE (OUTPATIENT)
Dept: FAMILY MEDICINE CLINIC | Facility: CLINIC | Age: 56
End: 2021-11-09

## 2021-11-09 DIAGNOSIS — M72.2 PLANTAR FASCIITIS: Primary | ICD-10-CM

## 2021-11-09 NOTE — TELEPHONE ENCOUNTER
Provider: GEOFF GERARDO  Caller: JOCELIN REIS  Relationship to Patient: SELF  Phone Number: [phone]   Reason for Call: PLANTAR FASCIITIS HAS FLARED UP AGAIN AND SHE IS WANTING TO KNOW WHAT SHE NEEDS TO DO. PLEASE CALL AND ADVISE

## 2021-11-11 NOTE — TELEPHONE ENCOUNTER
Pt states she has been wearing the braces that go around her feet and using a topical cream for relief. She states this has helped some.     I advised the pt to do warm soaks and roll feet on frozen water bottles.     Pt would like to know if volteren gel would help. If so she would like a rx for this. Is there anything else for the pt do     Please advise.

## 2021-12-11 DIAGNOSIS — F33.0 MAJOR DEPRESSIVE DISORDER, RECURRENT EPISODE, MILD DEGREE (HCC): ICD-10-CM

## 2021-12-13 RX ORDER — VENLAFAXINE 75 MG/1
TABLET ORAL
Qty: 30 TABLET | Refills: 1 | Status: SHIPPED | OUTPATIENT
Start: 2021-12-13 | End: 2022-03-09

## 2021-12-20 ENCOUNTER — OFFICE VISIT (OUTPATIENT)
Dept: PODIATRY | Facility: CLINIC | Age: 56
End: 2021-12-20

## 2021-12-20 VITALS
OXYGEN SATURATION: 100 % | BODY MASS INDEX: 32.61 KG/M2 | HEART RATE: 77 BPM | WEIGHT: 191 LBS | SYSTOLIC BLOOD PRESSURE: 143 MMHG | HEIGHT: 64 IN | TEMPERATURE: 97.3 F | DIASTOLIC BLOOD PRESSURE: 98 MMHG

## 2021-12-20 DIAGNOSIS — M79.671 FOOT PAIN, RIGHT: Primary | ICD-10-CM

## 2021-12-20 DIAGNOSIS — M72.2 PLANTAR FASCIITIS: ICD-10-CM

## 2021-12-20 PROCEDURE — 99243 OFF/OP CNSLTJ NEW/EST LOW 30: CPT | Performed by: PODIATRIST

## 2021-12-20 PROCEDURE — 20550 NJX 1 TENDON SHEATH/LIGAMENT: CPT | Performed by: PODIATRIST

## 2021-12-20 RX ORDER — LIDOCAINE HYDROCHLORIDE 10 MG/ML
0.5 INJECTION, SOLUTION INFILTRATION; PERINEURAL ONCE
Status: COMPLETED | OUTPATIENT
Start: 2021-12-20 | End: 2021-12-20

## 2021-12-20 RX ORDER — TRIAMCINOLONE ACETONIDE 40 MG/ML
20 INJECTION, SUSPENSION INTRA-ARTICULAR; INTRAMUSCULAR ONCE
Status: COMPLETED | OUTPATIENT
Start: 2021-12-20 | End: 2021-12-20

## 2021-12-20 RX ORDER — DEXAMETHASONE SODIUM PHOSPHATE 4 MG/ML
2 INJECTION, SOLUTION INTRA-ARTICULAR; INTRALESIONAL; INTRAMUSCULAR; INTRAVENOUS; SOFT TISSUE ONCE
Status: COMPLETED | OUTPATIENT
Start: 2021-12-20 | End: 2021-12-20

## 2021-12-20 RX ORDER — DICLOFENAC SODIUM 75 MG/1
75 TABLET, DELAYED RELEASE ORAL 2 TIMES DAILY
COMMUNITY

## 2021-12-20 RX ADMIN — LIDOCAINE HYDROCHLORIDE 0.5 ML: 10 INJECTION, SOLUTION INFILTRATION; PERINEURAL at 16:05

## 2021-12-20 RX ADMIN — DEXAMETHASONE SODIUM PHOSPHATE 2 MG: 4 INJECTION, SOLUTION INTRA-ARTICULAR; INTRALESIONAL; INTRAMUSCULAR; INTRAVENOUS; SOFT TISSUE at 16:03

## 2021-12-20 RX ADMIN — TRIAMCINOLONE ACETONIDE 20 MG: 40 INJECTION, SUSPENSION INTRA-ARTICULAR; INTRAMUSCULAR at 16:04

## 2021-12-20 NOTE — PROGRESS NOTES
[facility] - PODIATRY    Today's Date: 21    Patient Name: Swapna Tay  MRN: [id number]  CSN: [id number]  PCP: Samantha Dash APRN, Last PCP Visit:  2021  Referring Provider: Samantha Dash APRN    SUBJECTIVE     Chief Complaint   Patient presents with   • Right Foot - Pain     HPI: Swapna Tay, a 56 y.o.female, comes to clinic.    New, Established, New Problem: New    Location: Right heel    Duration: 2021    Onset:  gradual    Nature:  achy, sharp, shooting    Stable, worsening, improving: Worsening    Aggravating factors:      Patient describes morning right heel heel pain as stabbing, burning, or aching. This pain usually subsides throughout the day, however it returns after periods of rest and sitting, when standing back up on their feet, and again the next morning.      Previous Treatment: Previous left plantar fasciitis treated with cortisone injections along with recent change in shoe gear and arch supports    Patient denies any fevers, chills, nausea, vomiting, shortness of breath, nor any other constitutional signs nor symptoms.    No other pedal complaints at this time.    Past Medical History:   Diagnosis Date   • Allergic rhinitis    • Carpal tunnel syndrome, bilateral    • Cataract    • Contact dermatitis    • Eczema    • Gastric ulcer    • Genital herpes    • High cholesterol    • Hyperlipidemia 2018   • Hypertension    • Menopause    • Pap smear for cervical cancer screening 01/15/2020    normal repeat 5 yrs    • Plantar fasciitis    • Restless leg syndrome    • Screening mammogram, encounter for 2021    WNL   • Seasonal allergies      Past Surgical History:   Procedure Laterality Date   •  SECTION     • COLONOSCOPY     • HERNIA REPAIR  ,    • OTHER SURGICAL HISTORY      [facility] [place] Balloon therapy - Dr. Douglas [place], [place]     Family History   Problem Relation Age of Onset   • Stroke Mother    •  Arthritis Mother    • Diabetes Father    • Arthritis Father    • Cancer Sister    • Ovarian cancer Sister    • Arthritis Sister    • Cancer Brother    • Colon cancer Brother      Social History     Socioeconomic History   • Marital status:    Tobacco Use   • Smoking status: Never Smoker   • Smokeless tobacco: Never Used   Vaping Use   • Vaping Use: Never used   Substance and Sexual Activity   • Alcohol use: Never   • Drug use: Never   • Sexual activity: Defer     Allergies   Allergen Reactions   • Lactose Intolerance (Gi) Unknown - Low Severity     Current Outpatient Medications   Medication Sig Dispense Refill   • ascorbic acid (VITAMIN C) 1000 MG tablet Take 1,000 mg by mouth Daily.     • BLACK ELDERBERRY PO Take  by mouth Daily.     • Cyanocobalamin (B-12 PO) Take  by mouth. 3 sprays daily     • diclofenac (VOLTAREN) 75 MG EC tablet Take 75 mg by mouth 2 (Two) Times a Day.     • fluticasone (FLONASE) 50 MCG/ACT nasal spray SHAKE LIQUID AND USE 1 TO 2 SPRAYS(50  MCG) IN EACH NOSTRIL EVERY DAY AS NEEDED 16 g 1   • ibuprofen (ADVIL,MOTRIN) 800 MG tablet Take 800 mg by mouth Every 8 (Eight) Hours As Needed.     • Menthol, Topical Analgesic, (STOPAIN ROLL-ON EX) Apply  topically As Needed.     • rosuvastatin (CRESTOR) 10 MG tablet Take 1 tablet by mouth Every Night. 90 tablet 1   • telmisartan (MICARDIS) 20 MG tablet Take 20 mg by mouth Daily.     • valACYclovir (VALTREX) 500 MG tablet TAKE 1 TABLET(500 MG) BY MOUTH EVERY DAY 30 tablet 0   • venlafaxine (EFFEXOR) 75 MG tablet TAKE 1 TABLET BY MOUTH EVERY DAY AT BEDTIME 30 tablet 1     Current Facility-Administered Medications   Medication Dose Route Frequency Provider Last Rate Last Admin   • dexamethasone sodium phosphate injection 2 mg  2 mg Intramuscular Once Cb Schwarz DPM       • lidocaine (XYLOCAINE) 1 % injection 0.5 mL  0.5 mL Infiltration Once Cb Schwarz DPM       • triamcinolone acetonide (KENALOG-40) injection 20 mg   20 mg Intramuscular Once Cb Schwarz DPM         Review of Systems   Constitutional: Negative.    Musculoskeletal:        Right heel pain   All other systems reviewed and are negative.      OBJECTIVE     Vitals:    12/20/21 1514   BP: 143/98   Pulse: 77   Temp: 97.3 °F (36.3 °C)   SpO2: 100%       PHYSICAL EXAM     Foot/Ankle Exam:       General:   Appearance: appears stated age and healthy    Orientation: AAOx3    Affect: appropriate    Gait: unimpaired      VASCULAR      Right Foot Vascularity   Normal vascular exam    Dorsalis pedis:  2+  Posterior tibial:  2+  Skin Temperature: warm    Edema Grading:  None  CFT:  < 3 seconds  Pedal Hair Growth:  Present  Varicosities: none       Left Foot Vascularity   Normal vascular exam    Dorsalis pedis:  2+  Posterior tibial:  2+  Skin Temperature: warm    Edema Grading:  None  CFT:  < 3 seconds  Pedal Hair Growth:  Present  Varicosities: none        NEUROLOGIC     Right Foot Neurologic   Normal sensation    Light touch sensation:  Normal  Vibratory sensation:  Normal  Hot/Cold sensation: normal    Protective Sensation using [place] Monofilament:  10     Left Foot Neurologic   Normal sensation    Light touch sensation:  Normal  Vibratory sensation:  Normal  Hot/cold sensation: normal    Protective Sensation using [place] Monofilament:  10     MUSCULOSKELETAL      Right Foot Musculoskeletal   Tenderness: plantar fascia and plantar heel       MUSCLE STRENGTH     Right Foot Muscle Strength   Normal strength    Foot dorsiflexion:  5  Foot plantar flexion:  5  Foot inversion:  5  Foot eversion:  5     Left Foot Muscle Strength   Foot dorsiflexion:  5  Foot plantar flexion:  5  Foot inversion:  5  Foot eversion:  5     RANGE OF MOTION      Right Foot Range of Motion   Foot and ankle ROM within normal limits       Left Foot Range of Motion   Foot and ankle ROM within normal limits       DERMATOLOGIC     Right Foot Dermatologic   Skin: skin intact   "  Nails: normal       Left Foot Dermatologic   Skin: skin intact    Nails: normal        RADIOLOGY:    XR Foot 3+ View Right    Result Date: 12/20/2021  Narrative: IN-OFFICE IMAGING:  3 view, AP, MO, Lateral, right foot Indication: Heel pain Findings: Mild narrowing of the first metatarsal phalangeal joint.  Anterior cyma line break seen.  No periosteal reactions nor osteolytic changes seen.  No occult fractures seen. Comparison: No comparison views available.        ASSESSMENT/PLAN     Diagnoses and all orders for this visit:    1. Foot pain, right (Primary)    2. Plantar fasciitis  -     triamcinolone acetonide (KENALOG-40) injection 20 mg  -     dexamethasone sodium phosphate injection 2 mg  -     lidocaine (XYLOCAINE) 1 % injection 0.5 mL      Comprehensive lower extremity examination and evaluation was performed.    Discussed findings and treatment plan including risks, benefits, and treatment options with patient in detail. Patient agreed with treatment plan.    Plantar Fasciits Injection:    Date/Time: 12/20/2021  Performed by: Cb Schwarz DPM  Authorized by: Cb Schwarz DPM     Consent: Verbal consent obtained. Written consent obtained.  Risks and benefits: risks, benefits and alternatives were discussed  Consent given by: patient  Patient identity confirmed: verbally with patient  Indications: pain relief    Injection site: Right heel.    Sedation:  none    Patient position: sitting  Needle size: 27 G, 1 1/4\" in length  Injection medications:  0.5 ml 1% Lidocaine plain, 0.5 ml Kenalog 40 mg/ml, and 0.5 ml Decadron 4 mg/mL.   Outcome: pain improved    Patient tolerated the procedure well with no immediate complications.    Heel pain treatment Options discussed:  - no treatment at all  - change in shoegear  - change in activities  - RICE therapy  - arch support  - NSAIDs  - PO steroids  - injectable steroids    An After Visit Summary was printed and given to the patient at discharge, including " (if requested) any available informative/educational handouts regarding diagnosis, treatment, or medications. All questions were answered to patient/family satisfaction. Should symptoms fail to improve or worsen they agree to call or return to clinic or to go to the Emergency Department. Discussed the importance of following up with any needed screening tests/labs/specialist appointments and any requested follow-up recommended by me today. Importance of maintaining follow-up discussed and patient accepts that missed appointments can delay diagnosis and potentially lead to worsening of conditions.    Return in about 2 weeks (around 1/3/2022) for Post-Procedure., or sooner if acute issues arise.    This document has been electronically signed by Cb Schwarz DPM on December 20, 2021 15:54 EST

## 2021-12-21 RX ORDER — TELMISARTAN 20 MG/1
TABLET ORAL
Qty: 30 TABLET | Refills: 1 | Status: SHIPPED | OUTPATIENT
Start: 2021-12-21 | End: 2021-12-21

## 2021-12-21 RX ORDER — TELMISARTAN 20 MG/1
TABLET ORAL
Qty: 90 TABLET | Refills: 0 | Status: SHIPPED | OUTPATIENT
Start: 2021-12-21 | End: 2022-01-20 | Stop reason: SDUPTHER

## 2022-01-04 ENCOUNTER — TELEPHONE (OUTPATIENT)
Dept: FAMILY MEDICINE CLINIC | Facility: CLINIC | Age: 57
End: 2022-01-04

## 2022-01-04 NOTE — TELEPHONE ENCOUNTER
----- Message from Swapna Tay sent at 1/4/2022 11:05 AM EST -----  Regarding: Right foot needs attention   Good morning Mrs. Dash,  I twist my right  foot last Wednesday causing swelling,  turning black and blue and pain. I am still having pain and feel I need medical care. Please let me know if I can get any care for this.   Thank you,    MRS. Tay

## 2022-01-05 ENCOUNTER — HOSPITAL ENCOUNTER (OUTPATIENT)
Dept: GENERAL RADIOLOGY | Facility: HOSPITAL | Age: 57
Discharge: HOME | End: 2022-01-05
Admitting: NURSE PRACTITIONER

## 2022-01-05 ENCOUNTER — OFFICE VISIT (OUTPATIENT)
Dept: FAMILY MEDICINE CLINIC | Facility: CLINIC | Age: 57
End: 2022-01-05

## 2022-01-05 ENCOUNTER — OFFICE VISIT (OUTPATIENT)
Dept: PODIATRY | Facility: CLINIC | Age: 57
End: 2022-01-05

## 2022-01-05 ENCOUNTER — TELEPHONE (OUTPATIENT)
Dept: FAMILY MEDICINE CLINIC | Facility: CLINIC | Age: 57
End: 2022-01-05

## 2022-01-05 VITALS
BODY MASS INDEX: 31.92 KG/M2 | SYSTOLIC BLOOD PRESSURE: 134 MMHG | DIASTOLIC BLOOD PRESSURE: 92 MMHG | OXYGEN SATURATION: 100 % | HEART RATE: 64 BPM | HEIGHT: 64 IN | WEIGHT: 187 LBS

## 2022-01-05 VITALS
OXYGEN SATURATION: 100 % | DIASTOLIC BLOOD PRESSURE: 86 MMHG | HEART RATE: 69 BPM | SYSTOLIC BLOOD PRESSURE: 146 MMHG | HEIGHT: 64 IN | BODY MASS INDEX: 31.41 KG/M2 | WEIGHT: 184 LBS

## 2022-01-05 DIAGNOSIS — M79.671 RIGHT FOOT PAIN: Primary | ICD-10-CM

## 2022-01-05 DIAGNOSIS — M79.671 FOOT PAIN, RIGHT: Primary | ICD-10-CM

## 2022-01-05 DIAGNOSIS — M79.671 RIGHT FOOT PAIN: ICD-10-CM

## 2022-01-05 DIAGNOSIS — S92.354A CLOSED NONDISPLACED FRACTURE OF FIFTH METATARSAL BONE OF RIGHT FOOT, INITIAL ENCOUNTER: ICD-10-CM

## 2022-01-05 PROCEDURE — 99213 OFFICE O/P EST LOW 20 MIN: CPT | Performed by: NURSE PRACTITIONER

## 2022-01-05 PROCEDURE — 99213 OFFICE O/P EST LOW 20 MIN: CPT | Performed by: PODIATRIST

## 2022-01-05 PROCEDURE — 73630 X-RAY EXAM OF FOOT: CPT

## 2022-01-05 NOTE — TELEPHONE ENCOUNTER
----- Message from AMRITA Givens sent at 1/5/2022 12:25 PM EST -----  5th metatarsal fx with some displacement, consult podiatry asap

## 2022-01-05 NOTE — PROGRESS NOTES
Chief Complaint  Foot Pain    Subjective            Swapna Tay presents to [facility]  Pt here today c/o right foot pain. Pt states ankle twisted while walking downhill last 21.     Pt states was swollen and bruised. Pt has tried RICE, thinking it would get better.     Pt declines flu vaccine at this time.        PMH  Past Medical History:   Diagnosis Date   • Allergic rhinitis    • Carpal tunnel syndrome, bilateral    • Cataract    • Contact dermatitis    • Eczema    • Gastric ulcer    • Genital herpes    • High cholesterol    • Hyperlipidemia 2018   • Hypertension    • Menopause    • Pap smear for cervical cancer screening 01/15/2020    normal repeat 5 yrs    • Plantar fasciitis    • Restless leg syndrome    • Screening mammogram, encounter for 2021    WNL   • Seasonal allergies        ALLERGY  Allergies   Allergen Reactions   • Lactose Intolerance (Gi) Unknown - Low Severity        SURGICALHX  Past Surgical History:   Procedure Laterality Date   •  SECTION     • COLONOSCOPY     • HERNIA REPAIR  ,    • OTHER SURGICAL HISTORY      [facility] [place] Ball[facility] therapy - Dr. Douglas [place], [place]        SOCX  Social History     Tobacco Use   • Smoking status: Never Smoker   • Smokeless tobacco: Never Used   Substance Use Topics   • Alcohol use: Never       FAMHX  Family History   Problem Relation Age of Onset   • Stroke Mother    • Arthritis Mother    • Diabetes Father    • Arthritis Father    • Cancer Sister    • Ovarian cancer Sister    • Arthritis Sister    • Cancer Brother    • Colon cancer Brother         MEDSIGONLY  Current Outpatient Medications on File Prior to Visit   Medication Sig   • ascorbic acid (VITAMIN C) 1000 MG tablet Take 1,000 mg by mouth Daily.   • BLACK ELDERBERRY PO Take  by mouth Daily.   • Cyanocobalamin (B-12 PO) Take  by mouth. 3 sprays daily   • diclofenac (VOLTAREN) 75 MG EC tablet Take 75  "mg by mouth 2 (Two) Times a Day.   • fluticasone (FLONASE) 50 MCG/ACT nasal spray SHAKE LIQUID AND USE 1 TO 2 SPRAYS(50  MCG) IN EACH NOSTRIL EVERY DAY AS NEEDED   • Menthol, Topical Analgesic, (STOPAIN ROLL-ON EX) Apply  topically As Needed.   • rosuvastatin (CRESTOR) 10 MG tablet Take 1 tablet by mouth Every Night.   • telmisartan (MICARDIS) 20 MG tablet TAKE 1 TABLET(20 MG) BY MOUTH EVERY DAY   • valACYclovir (VALTREX) 500 MG tablet TAKE 1 TABLET(500 MG) BY MOUTH EVERY DAY   • venlafaxine (EFFEXOR) 75 MG tablet TAKE 1 TABLET BY MOUTH EVERY DAY AT BEDTIME   • ibuprofen (ADVIL,MOTRIN) 800 MG tablet Take 800 mg by mouth Every 8 (Eight) Hours As Needed.     No current facility-administered medications on file prior to visit.       Health Maintenance Due   Topic Date Due   • ANNUAL PHYSICAL  Never done   • ZOSTER VACCINE (1 of 2) Never done   • HEPATITIS C SCREENING  Never done       Objective     /92   Pulse 64   Ht 162.6 cm (64\")   Wt 84.8 kg (187 lb)   SpO2 100%   BMI 32.10 kg/m²       Physical Exam  Constitutional:       General: She is not in acute distress.     Appearance: Normal appearance. She is not ill-appearing.   HENT:      Head: Normocephalic and atraumatic.      Mouth/Throat:      Pharynx: No oropharyngeal exudate or posterior oropharyngeal erythema.   Cardiovascular:      Rate and Rhythm: Normal rate and regular rhythm.      Pulses: Normal pulses.      Heart sounds: Normal heart sounds. No murmur heard.      Pulmonary:      Effort: Pulmonary effort is normal. No respiratory distress.      Breath sounds: Normal breath sounds.   Chest:      Chest wall: No tenderness.   Abdominal:      General: There is no distension.      Palpations: There is no mass.      Tenderness: There is no abdominal tenderness. There is no guarding.   Musculoskeletal:         General: No swelling. Normal range of motion.      Cervical back: Normal range of motion and neck supple.      Right foot: Tenderness " present.      Comments: Tenderness to top of right foot upon palpation   Skin:     General: Skin is warm and dry.      Findings: No rash.   Neurological:      General: No focal deficit present.      Mental Status: She is alert and oriented to person, place, and time. Mental status is at baseline.      Gait: Gait normal.   Psychiatric:         Mood and Affect: Mood normal.         Behavior: Behavior normal.         Thought Content: Thought content normal.         Judgment: Judgment normal.           Result Review :                           Assessment and Plan        Diagnoses and all orders for this visit:    1. Right foot pain (Primary)  -     XR Foot 3+ View Right; Future              Follow Up     Return if symptoms worsen or fail to improve.    Patient was given instructions and counseling regarding her condition or for health maintenance advice. Please see specific information pulled into the AVS if appropriate.     Swapna Tay  reports that she has never smoked. She has never used smokeless tobacco.

## 2022-01-05 NOTE — PROGRESS NOTES
[facility] - PODIATRY    Today's Date: 22    Patient Name: Swapna Tay  MRN: [id number]  CSN: [id number]  PCP: Samantha Dash APRN, Last PCP Visit: 2021  Referring Provider:     SUBJECTIVE     Chief Complaint   Patient presents with   • Right Foot - Pain     Injured foot 21     HPI: Swapna Tay, a 56 y.o.female, presents to clinic.    New, Established, New Problem: New problem    Location: Right fifth metatarsal    Duration: 2021    Onset: Acute, twisted her foot    Nature: Sore, achy    Stable, worsening, improving: Stable, no improvement    Aggravating factors:  Patient relates pain is aggravated by shoe gear and ambulation.      Previous Treatment: X-ray    Patient denies any fevers, chills, nausea, vomiting, shortness of breath, nor any other constitutional signs nor symptoms.    No other pedal complaints at this time.    Recent medical changes: None    Past Medical History:   Diagnosis Date   • Allergic rhinitis    • Carpal tunnel syndrome, bilateral    • Cataract    • Contact dermatitis    • Eczema    • Gastric ulcer    • Genital herpes    • High cholesterol    • Hyperlipidemia 2018   • Hypertension    • Menopause    • Pap smear for cervical cancer screening 01/15/2020    normal repeat 5 yrs    • Plantar fasciitis    • Restless leg syndrome    • Screening mammogram, encounter for 2021    WNL   • Seasonal allergies      Past Surgical History:   Procedure Laterality Date   •  SECTION     • COLONOSCOPY     • HERNIA REPAIR  ,    • OTHER SURGICAL HISTORY      [facility] [place] therapy - Dr. Douglas [place], [place]     Family History   Problem Relation Age of Onset   • Stroke Mother    • Arthritis Mother    • Diabetes Father    • Arthritis Father    • Cancer Sister    • Ovarian cancer Sister    • Arthritis Sister    • Cancer Brother    • Colon cancer Brother      Social History     Socioeconomic History   •  Marital status:    Tobacco Use   • Smoking status: Never Smoker   • Smokeless tobacco: Never Used   Vaping Use   • Vaping Use: Never used   Substance and Sexual Activity   • Alcohol use: Never   • Drug use: Never   • Sexual activity: Defer     Allergies   Allergen Reactions   • Lactose Intolerance (Gi) Unknown - Low Severity     Current Outpatient Medications   Medication Sig Dispense Refill   • ascorbic acid (VITAMIN C) 1000 MG tablet Take 1,000 mg by mouth Daily.     • BLACK ELDERBERRY PO Take  by mouth Daily.     • Cyanocobalamin (B-12 PO) Take  by mouth. 3 sprays daily     • diclofenac (VOLTAREN) 75 MG EC tablet Take 75 mg by mouth 2 (Two) Times a Day.     • fluticasone (FLONASE) 50 MCG/ACT nasal spray SHAKE LIQUID AND USE 1 TO 2 SPRAYS(50  MCG) IN EACH NOSTRIL EVERY DAY AS NEEDED 16 g 1   • ibuprofen (ADVIL,MOTRIN) 800 MG tablet Take 800 mg by mouth Every 8 (Eight) Hours As Needed.     • Menthol, Topical Analgesic, (STOPAIN ROLL-ON EX) Apply  topically As Needed.     • rosuvastatin (CRESTOR) 10 MG tablet Take 1 tablet by mouth Every Night. 90 tablet 1   • telmisartan (MICARDIS) 20 MG tablet TAKE 1 TABLET(20 MG) BY MOUTH EVERY DAY 90 tablet 0   • valACYclovir (VALTREX) 500 MG tablet TAKE 1 TABLET(500 MG) BY MOUTH EVERY DAY 30 tablet 0   • venlafaxine (EFFEXOR) 75 MG tablet TAKE 1 TABLET BY MOUTH EVERY DAY AT BEDTIME 30 tablet 1     No current facility-administered medications for this visit.     Review of Systems   Constitutional: Negative.    Musculoskeletal:        Tenderness to distal right fifth metatarsal shaft   All other systems reviewed and are negative.      OBJECTIVE     Vitals:    01/05/22 1349   BP: 146/86   Pulse: 69   SpO2: 100%       WBC   Date Value Ref Range Status   05/10/2021 5.59 4.80 - 10.80 10*3/uL Final     RBC   Date Value Ref Range Status   05/10/2021 4.06 (L) 4.20 - 5.40 10*6/uL Final     Hemoglobin   Date Value Ref Range Status   05/10/2021 11.8 (L) 12.0 - 16.0 g/dL Final      Hematocrit   Date Value Ref Range Status   05/10/2021 36.4 (L) 37.0 - 47.0 % Final     MCV   Date Value Ref Range Status   05/10/2021 89.7 81.0 - 99.0 fL Final     MCH   Date Value Ref Range Status   05/10/2021 29.1 27.0 - 31.0 pg Final     MCHC   Date Value Ref Range Status   05/10/2021 32.4 (L) 33.0 - 37.0 Final     RDW   Date Value Ref Range Status   05/10/2021 12.5 11.7 - 14.4 % Final     RDW-SD   Date Value Ref Range Status   05/10/2021 40.9 36.4 - 46.3 fL Final     MPV   Date Value Ref Range Status   05/10/2021 10.4 9.4 - 12.3 fL Final     Platelets   Date Value Ref Range Status   05/10/2021 256 130 - 400 10*3/uL Final     Neutrophil Rel %   Date Value Ref Range Status   05/10/2021 32.6 30.0 - 85.0 % Final     Lymphocyte Rel %   Date Value Ref Range Status   05/10/2021 52.4 (H) 20.0 - 45.0 % Final     Monocyte Rel %   Date Value Ref Range Status   05/10/2021 10.9 (H) 3.0 - 10.0 % Final     Eosinophil Rel %   Date Value Ref Range Status   05/10/2021 3.2 0.0 - 7.0 % Final     Basophil Rel %   Date Value Ref Range Status   05/10/2021 0.5 0.0 - 3.0 % Final     Neutrophils Absolute   Date Value Ref Range Status   05/10/2021 1.82 (L) 2.00 - 8.00 10*3/uL Final     Lymphocytes Absolute   Date Value Ref Range Status   05/10/2021 2.93 1.00 - 5.00 10*3/uL Final     Monocytes Absolute   Date Value Ref Range Status   05/10/2021 0.61 0.20 - 1.20 10*3/uL Final     Eosinophils Absolute   Date Value Ref Range Status   05/10/2021 0.18 0.00 - 0.70 10*3/uL Final     Basophils Absolute   Date Value Ref Range Status   05/10/2021 0.03 0.00 - 0.20 10*3/uL Final     NRBC   Date Value Ref Range Status   05/10/2021 0.00 0.00 - 0.70 % Final         Lab Results   Component Value Date    GLUCOSE 84 04/07/2021    BUN 8 04/07/2021    CREATININE 0.86 04/07/2021    BCR 9 04/07/2021    K 4.0 04/07/2021    CO2 23 04/07/2021    CALCIUM 9.3 04/07/2021    ALBUMIN 3.9 04/07/2021    LABIL2 1.2 (L) 04/07/2021    AST 18 04/07/2021    ALT 14  04/07/2021       Patient seen in no apparent distress.      PHYSICAL EXAM:     Foot/Ankle Exam:       General:   Appearance: appears stated age and healthy    Orientation: AAOx3    Affect: appropriate    Gait: unimpaired    Shoe Gear:  Casual shoes    VASCULAR      Right Foot Vascularity   Normal vascular exam    Dorsalis pedis:  2+  Posterior tibial:  2+  Skin Temperature: warm    Edema Grading:  None  CFT:  < 3 seconds  Pedal Hair Growth:  Present  Varicosities: none       Left Foot Vascularity   Normal vascular exam    Dorsalis pedis:  2+  Posterior tibial:  2+  Skin Temperature: warm    Edema Grading:  None  CFT:  < 3 seconds  Pedal Hair Growth:  Present  Varicosities: none        NEUROLOGIC     Right Foot Neurologic   Normal sensation    Light touch sensation:  Normal  Vibratory sensation:  Normal  Hot/Cold sensation: normal    Protective Sensation using [place] Monofilament:  10     Left Foot Neurologic   Normal sensation    Light touch sensation:  Normal  Vibratory sensation:  Normal  Hot/cold sensation: normal    Protective Sensation using [place] Monofilament:  10     MUSCULOSKELETAL      Right Foot Musculoskeletal   Tenderness comment:  Distal right fifth metatarsal shaft.     MUSCLE STRENGTH     Right Foot Muscle Strength   Foot dorsiflexion:  5 (Guarding with strength testing)  Foot plantar flexion:  5  Foot inversion:  5  Foot eversion:  5     Left Foot Muscle Strength   Foot dorsiflexion:  5  Foot plantar flexion:  5  Foot inversion:  5  Foot eversion:  5     RANGE OF MOTION      Right Foot Range of Motion   Right ankle active dorsiflexion: Guarding with range of motion testing.     Left Foot Range of Motion   Foot and ankle ROM within normal limits       DERMATOLOGIC     Right Foot Dermatologic   Skin: skin intact    Nails: normal       Left Foot Dermatologic   Skin: skin intact    Nails: normal        RADIOLOGY:      XR Foot 3+ View Right    Result Date: 1/5/2022  Narrative:  PROCEDURE: XR FOOT 3+ VW RIGHT  COMPARISON: [facility], CR, XR FOOT 3+ VW RIGHT, 12/20/2021, 16:53.  INDICATIONS: ROLLED RIGHT FOOT A WEEK AGO. PAIN ALONG THE 5TH METATARSAL SINCE.  FINDINGS:  Oblique fracture of the distal shaft of the 5th metatarsal is seen with 2-3 mm of medial displacement.  Bony structures otherwise appear intact.  Mild degenerative spurring consistent with osteoarthritis is seen in the tarsal region and 1st MTP joint.  A small plantar spur is evident.  CONCLUSION:  Right foot series demonstrating oblique fracture of the distal 5th metatarsal, with 2-3 mm of medial displacement.      SAV MARTINEZ MD       Electronically Signed and Approved By: SAV MARTINEZ MD on 1/05/2022 at 12:03               ASSESSMENT/PLAN     Diagnoses and all orders for this visit:    1. Foot pain, right (Primary)    2. Closed nondisplaced fracture of fifth metatarsal bone of right foot, initial encounter        Comprehensive lower extremity examination and evaluation was performed.    Discussed findings and treatment plan including risks, benefits, and treatment options with patient in detail. Patient agreed with treatment plan.    Medications and allergies reviewed.  Reviewed available lab values along with other pertinent labs.  These were discussed with the patient.    Rice Therapy: It is important to treat any injury as soon as possible to help control swelling and increase recovery time. The recognized regimen for immediate treatment of sport injuries includes rest, ice (cold application), compression, and elevation (RICE). Remove the injured athlete from play, apply ice to the affected area, wrap or compress the injured area with an elastic bandage when appropriate, and elevate the injured area above heart level to reduce swelling.  The patient is to not use ice for longer than 20 minutes at a time, with at least 20 minutes of no ice usage between applications.  The patient states  understanding and agreement with this plan.    CAM walker applied to Right lower leg.  Patient instructed to utilize for partial-weight bearing at all time with CAM walker.  The patient states understanding and agreement with this plan.  Dr. Schwarz advised patient may resume driving, but advised not to drive while wearing an ambulatory device.  Advised quick/hard depression of brakes could cause injury to areas.  Also advised of possible legal implications while driving in restrictive device.  The patient states understanding and agreement with these instructions.    An After Visit Summary was printed and given to the patient at discharge, including (if requested) any available informative/educational handouts regarding diagnosis, treatment, or medications. All questions were answered to patient/family satisfaction. Should symptoms fail to improve or worsen they agree to call or return to clinic or to go to the Emergency Department. Discussed the importance of following up with any needed screening tests/labs/specialist appointments and any requested follow-up recommended by me today. Importance of maintaining follow-up discussed and patient accepts that missed appointments can delay diagnosis and potentially lead to worsening of conditions.    Return in about 3 weeks (around 1/26/2022) for Fracture follow-up, x-ray., or sooner if acute issues arise.    This document has been electronically signed by Cb Schwarz DPM on January 5, 2022 14:19 EST

## 2022-01-20 ENCOUNTER — TELEMEDICINE (OUTPATIENT)
Dept: FAMILY MEDICINE CLINIC | Facility: CLINIC | Age: 57
End: 2022-01-20

## 2022-01-20 VITALS — HEIGHT: 64 IN | WEIGHT: 184 LBS | BODY MASS INDEX: 31.41 KG/M2

## 2022-01-20 DIAGNOSIS — E78.5 HYPERLIPIDEMIA, UNSPECIFIED HYPERLIPIDEMIA TYPE: ICD-10-CM

## 2022-01-20 DIAGNOSIS — Z12.31 ENCOUNTER FOR SCREENING MAMMOGRAM FOR MALIGNANT NEOPLASM OF BREAST: ICD-10-CM

## 2022-01-20 DIAGNOSIS — I10 HYPERTENSION, UNSPECIFIED TYPE: Primary | ICD-10-CM

## 2022-01-20 DIAGNOSIS — B02.8 HERPES ZOSTER WITH COMPLICATION: ICD-10-CM

## 2022-01-20 PROBLEM — H26.8 MATURE CATARACT: Status: ACTIVE | Noted: 2021-06-21

## 2022-01-20 PROBLEM — H25.819 COMBINED FORM OF SENILE CATARACT: Status: ACTIVE | Noted: 2020-02-24

## 2022-01-20 PROCEDURE — 99214 OFFICE O/P EST MOD 30 MIN: CPT | Performed by: NURSE PRACTITIONER

## 2022-01-20 RX ORDER — ROSUVASTATIN CALCIUM 10 MG/1
10 TABLET, COATED ORAL NIGHTLY
Qty: 90 TABLET | Refills: 1 | Status: SHIPPED | OUTPATIENT
Start: 2022-01-20 | End: 2022-07-29

## 2022-01-20 RX ORDER — PREDNISOLONE SODIUM PHOSPHATE 10 MG/ML
SOLUTION/ DROPS OPHTHALMIC
COMMUNITY
Start: 2021-11-04

## 2022-01-20 RX ORDER — VALACYCLOVIR HYDROCHLORIDE 500 MG/1
500 TABLET, FILM COATED ORAL DAILY
Qty: 30 TABLET | Refills: 1 | Status: SHIPPED | OUTPATIENT
Start: 2022-01-20 | End: 2022-03-17

## 2022-01-20 RX ORDER — TELMISARTAN 20 MG/1
20 TABLET ORAL DAILY
Qty: 90 TABLET | Refills: 1 | Status: SHIPPED | OUTPATIENT
Start: 2022-01-20

## 2022-01-20 NOTE — PROGRESS NOTES
You have chosen to receive care through a telehealth visit.  Do you consent to use a video/audio connection for your medical care today? Yes.      Chief Complaint  Hypertension, hyperlipidemia, herpes simpex    Subjective            Swapna Tay presents to [facility]  Pt doing Tele Health today for 6 month f/u on HTN, hyperlipidemia and herpes simplex.  She is doing well on all meds with no complaints.    She plans to wean off effexor now that she is done with menopause as this is the only reason she was taking it.    Pt needs refills sent to [facility].    Pt due labs.     Pt due Mammo 2022    Colonoscopy done              PMH  Past Medical History:   Diagnosis Date   • Allergic rhinitis    • Carpal tunnel syndrome, bilateral    • Cataract    • Contact dermatitis    • Eczema    • Gastric ulcer    • Genital herpes    • High cholesterol    • Hyperlipidemia 2018   • Hypertension    • Menopause    • Pap smear for cervical cancer screening 01/15/2020    normal repeat 5 yrs    • Plantar fasciitis    • Restless leg syndrome    • Screening mammogram, encounter for 2021    WNL   • Seasonal allergies        ALLERGY  Allergies   Allergen Reactions   • Lactose Intolerance (Gi) Unknown - Low Severity        SURGICALHX  Past Surgical History:   Procedure Laterality Date   •  SECTION     • COLONOSCOPY     • HERNIA REPAIR  ,    • OTHER SURGICAL HISTORY      [facility] therapy - Dr. Douglas [place], [place]        SOCX  Social History     Tobacco Use   • Smoking status: Never Smoker   • Smokeless tobacco: Never Used   Substance Use Topics   • Alcohol use: Never       FAMHX  Family History   Problem Relation Age of Onset   • Stroke Mother    • Arthritis Mother    • Diabetes Father    • Arthritis Father    • Cancer Sister    • Ovarian cancer Sister    • Arthritis Sister    • Cancer Brother    • Colon cancer Brother      "    MEDSIGONLSAJI  Current Outpatient Medications on File Prior to Visit   Medication Sig   • ascorbic acid (VITAMIN C) 1000 MG tablet Take 1,000 mg by mouth Daily.   • BLACK ELDERBERRY PO Take  by mouth Daily.   • Cyanocobalamin (B-12 PO) Take  by mouth. 3 sprays daily   • diclofenac (VOLTAREN) 75 MG EC tablet Take 75 mg by mouth 2 (Two) Times a Day.   • fluticasone (FLONASE) 50 MCG/ACT nasal spray SHAKE LIQUID AND USE 1 TO 2 SPRAYS(50  MCG) IN EACH NOSTRIL EVERY DAY AS NEEDED   • Menthol, Topical Analgesic, (STOPAIN ROLL-ON EX) Apply  topically As Needed.   • prednisoLONE sodium phosphate (INFLAMASE FORTE) 1 % ophthalmic solution Instill 1 drop in operative eye QID the day before sx, the day of sx and 1 week after sx, then BID for 3 weeks   • venlafaxine (EFFEXOR) 75 MG tablet TAKE 1 TABLET BY MOUTH EVERY DAY AT BEDTIME   • [DISCONTINUED] rosuvastatin (CRESTOR) 10 MG tablet Take 1 tablet by mouth Every Night.   • [DISCONTINUED] telmisartan (MICARDIS) 20 MG tablet TAKE 1 TABLET(20 MG) BY MOUTH EVERY DAY   • [DISCONTINUED] valACYclovir (VALTREX) 500 MG tablet TAKE 1 TABLET(500 MG) BY MOUTH EVERY DAY   • [DISCONTINUED] ibuprofen (ADVIL,MOTRIN) 800 MG tablet Take 800 mg by mouth Every 8 (Eight) Hours As Needed.     No current facility-administered medications on file prior to visit.       Health Maintenance Due   Topic Date Due   • ANNUAL PHYSICAL  Never done   • ZOSTER VACCINE (1 of 2) Never done   • HEPATITIS C SCREENING  Never done       Objective     Ht 162.6 cm (64\")   Wt 83.5 kg (184 lb)   BMI 31.58 kg/m²       Physical Exam  Psychiatric:         Attention and Perception: Attention normal.         Mood and Affect: Mood normal.         Speech: Speech normal.         Behavior: Behavior normal. Behavior is cooperative.         Thought Content: Thought content normal.         Cognition and Memory: Cognition normal.         Judgment: Judgment normal.           Result Review :                           Assessment " and Plan        Diagnoses and all orders for this visit:    1. Hypertension, unspecified type (Primary)  Comments:  stable on micardis 20mg, continue  Orders:  -     CBC w AUTO Differential; Future  -     Comprehensive metabolic panel; Future  -     telmisartan (MICARDIS) 20 MG tablet; Take 1 tablet by mouth Daily.  Dispense: 90 tablet; Refill: 1    2. Hyperlipidemia, unspecified hyperlipidemia type  Comments:  stable on crestor 10mg, continue  Orders:  -     rosuvastatin (CRESTOR) 10 MG tablet; Take 1 tablet by mouth Every Night.  Dispense: 90 tablet; Refill: 1  -     Lipid panel; Future    3. Herpes zoster with complication  Comments:  stable on valtrex 500mg, continue  Orders:  -     valACYclovir (VALTREX) 500 MG tablet; Take 1 tablet by mouth Daily.  Dispense: 30 tablet; Refill: 1    4. Encounter for screening mammogram for malignant neoplasm of breast  -     Mammo Screening Digital Tomosynthesis Bilateral With CAD; Future              Follow Up     Return in about 6 months (around 7/20/2022).    Patient was given instructions and counseling regarding her condition or for health maintenance advice. Please see specific information pulled into the AVS if appropriate.     Swapna Tay  reports that she has never smoked. She has never used smokeless tobacco..

## 2022-01-21 ENCOUNTER — LAB (OUTPATIENT)
Dept: LAB | Facility: HOSPITAL | Age: 57
End: 2022-01-21

## 2022-01-21 DIAGNOSIS — I10 HYPERTENSION, UNSPECIFIED TYPE: ICD-10-CM

## 2022-01-21 DIAGNOSIS — E78.5 HYPERLIPIDEMIA, UNSPECIFIED HYPERLIPIDEMIA TYPE: ICD-10-CM

## 2022-01-21 LAB
ALBUMIN SERPL-MCNC: 4.1 G/DL (ref 3.5–5.2)
ALBUMIN/GLOB SERPL: 1.3 G/DL
ALP SERPL-CCNC: 115 U/L (ref 39–117)
ALT SERPL W P-5'-P-CCNC: 14 U/L (ref 1–33)
ANION GAP SERPL CALCULATED.3IONS-SCNC: 7.9 MMOL/L (ref 5–15)
AST SERPL-CCNC: 15 U/L (ref 1–32)
BASOPHILS # BLD AUTO: 0.01 10*3/MM3 (ref 0–0.2)
BASOPHILS NFR BLD AUTO: 0.2 % (ref 0–1.5)
BILIRUB SERPL-MCNC: 0.4 MG/DL (ref 0–1.2)
BUN SERPL-MCNC: 11 MG/DL (ref 6–20)
BUN/CREAT SERPL: 12.9 (ref 7–25)
CALCIUM SPEC-SCNC: 9.2 MG/DL (ref 8.6–10.5)
CHLORIDE SERPL-SCNC: 105 MMOL/L (ref 98–107)
CHOLEST SERPL-MCNC: 153 MG/DL (ref 0–200)
CO2 SERPL-SCNC: 25.1 MMOL/L (ref 22–29)
CREAT SERPL-MCNC: 0.85 MG/DL (ref 0.57–1)
DEPRECATED RDW RBC AUTO: 44.4 FL (ref 37–54)
EOSINOPHIL # BLD AUTO: 0.18 10*3/MM3 (ref 0–0.4)
EOSINOPHIL NFR BLD AUTO: 4.4 % (ref 0.3–6.2)
ERYTHROCYTE [DISTWIDTH] IN BLOOD BY AUTOMATED COUNT: 13 % (ref 12.3–15.4)
GFR SERPL CREATININE-BSD FRML MDRD: 84 ML/MIN/1.73
GLOBULIN UR ELPH-MCNC: 3.1 GM/DL
GLUCOSE SERPL-MCNC: 89 MG/DL (ref 65–99)
HCT VFR BLD AUTO: 40 % (ref 34–46.6)
HDLC SERPL-MCNC: 53 MG/DL (ref 40–60)
HGB BLD-MCNC: 12.6 G/DL (ref 12–15.9)
IMM GRANULOCYTES # BLD AUTO: 0.01 10*3/MM3 (ref 0–0.05)
IMM GRANULOCYTES NFR BLD AUTO: 0.2 % (ref 0–0.5)
LDLC SERPL CALC-MCNC: 88 MG/DL (ref 0–100)
LDLC/HDLC SERPL: 1.68 {RATIO}
LYMPHOCYTES # BLD AUTO: 2.05 10*3/MM3 (ref 0.7–3.1)
LYMPHOCYTES NFR BLD AUTO: 50 % (ref 19.6–45.3)
MCH RBC QN AUTO: 29.4 PG (ref 26.6–33)
MCHC RBC AUTO-ENTMCNC: 31.5 G/DL (ref 31.5–35.7)
MCV RBC AUTO: 93.2 FL (ref 79–97)
MONOCYTES # BLD AUTO: 0.47 10*3/MM3 (ref 0.1–0.9)
MONOCYTES NFR BLD AUTO: 11.5 % (ref 5–12)
NEUTROPHILS NFR BLD AUTO: 1.38 10*3/MM3 (ref 1.7–7)
NEUTROPHILS NFR BLD AUTO: 33.7 % (ref 42.7–76)
NRBC BLD AUTO-RTO: 0 /100 WBC (ref 0–0.2)
PLATELET # BLD AUTO: 277 10*3/MM3 (ref 140–450)
PMV BLD AUTO: 10.7 FL (ref 6–12)
POTASSIUM SERPL-SCNC: 3.8 MMOL/L (ref 3.5–5.2)
PROT SERPL-MCNC: 7.2 G/DL (ref 6–8.5)
RBC # BLD AUTO: 4.29 10*6/MM3 (ref 3.77–5.28)
SODIUM SERPL-SCNC: 138 MMOL/L (ref 136–145)
TRIGL SERPL-MCNC: 56 MG/DL (ref 0–150)
VLDLC SERPL-MCNC: 12 MG/DL (ref 5–40)
WBC NRBC COR # BLD: 4.1 10*3/MM3 (ref 3.4–10.8)

## 2022-01-21 PROCEDURE — 80053 COMPREHEN METABOLIC PANEL: CPT

## 2022-01-21 PROCEDURE — 36415 COLL VENOUS BLD VENIPUNCTURE: CPT

## 2022-01-21 PROCEDURE — 85025 COMPLETE CBC W/AUTO DIFF WBC: CPT

## 2022-01-21 PROCEDURE — 80061 LIPID PANEL: CPT

## 2022-01-24 ENCOUNTER — TELEPHONE (OUTPATIENT)
Dept: FAMILY MEDICINE CLINIC | Facility: CLINIC | Age: 57
End: 2022-01-24

## 2022-01-27 ENCOUNTER — OFFICE VISIT (OUTPATIENT)
Dept: PODIATRY | Facility: CLINIC | Age: 57
End: 2022-01-27

## 2022-01-27 VITALS
OXYGEN SATURATION: 99 % | HEART RATE: 70 BPM | WEIGHT: 184 LBS | TEMPERATURE: 97.1 F | SYSTOLIC BLOOD PRESSURE: 132 MMHG | HEIGHT: 64 IN | BODY MASS INDEX: 31.41 KG/M2 | DIASTOLIC BLOOD PRESSURE: 81 MMHG

## 2022-01-27 DIAGNOSIS — S92.354S CLOSED NONDISPLACED FRACTURE OF FIFTH METATARSAL BONE OF RIGHT FOOT, SEQUELA: ICD-10-CM

## 2022-01-27 DIAGNOSIS — M79.671 FOOT PAIN, RIGHT: Primary | ICD-10-CM

## 2022-01-27 PROCEDURE — 99213 OFFICE O/P EST LOW 20 MIN: CPT | Performed by: PODIATRIST

## 2022-01-27 NOTE — PROGRESS NOTES
[facility] - PODIATRY    Today's Date: 22    Patient Name: Swapna Tay  MRN: [id number]  CSN: [id number]  PCP: Samantha Dash APRN, Last PCP Visit: 2021  Referring Provider:     SUBJECTIVE     Chief Complaint   Patient presents with   • Right Foot - Follow-up, Fracture     HPI: Swapna Tay, a 56 y.o.female, presents to clinic.    New, Established, New Problem: est    Location: Right fifth metatarsal    Duration: 2021    Onset: Acute, twisted her foot    Nature: Sore, achy    Stable, worsening, improving: improving    Aggravating factors:  Patient relates pain is aggravated by shoe gear and ambulation.      Previous Treatment: X-ray, CAM walker    Patient denies any fevers, chills, nausea, vomiting, shortness of breath, nor any other constitutional signs nor symptoms.    No other pedal complaints at this time.    Recent medical changes: None    Past Medical History:   Diagnosis Date   • Allergic rhinitis    • Carpal tunnel syndrome, bilateral    • Cataract    • Contact dermatitis    • Eczema    • Gastric ulcer    • Genital herpes    • High cholesterol    • Hyperlipidemia 2018   • Hypertension    • Menopause    • Pap smear for cervical cancer screening 01/15/2020    normal repeat 5 yrs    • Plantar fasciitis    • Restless leg syndrome    • Screening mammogram, encounter for 2021    WNL   • Seasonal allergies      Past Surgical History:   Procedure Laterality Date   •  SECTION     • COLONOSCOPY     • HERNIA REPAIR  ,    • OTHER SURGICAL HISTORY      [facility] [place] Ball[facility] [place]     Family History   Problem Relation Age of Onset   • Stroke Mother    • Arthritis Mother    • Diabetes Father    • Arthritis Father    • Cancer Sister    • Ovarian cancer Sister    • Arthritis Sister    • Cancer Brother    • Colon cancer Brother      Social History     Socioeconomic History   • Marital status:     Tobacco Use   • Smoking status: Never Smoker   • Smokeless tobacco: Never Used   Vaping Use   • Vaping Use: Never used   Substance and Sexual Activity   • Alcohol use: Never   • Drug use: Never   • Sexual activity: Defer     Allergies   Allergen Reactions   • Lactose Intolerance (Gi) Unknown - Low Severity     Current Outpatient Medications   Medication Sig Dispense Refill   • ascorbic acid (VITAMIN C) 1000 MG tablet Take 1,000 mg by mouth Daily.     • BLACK ELDERBERRY PO Take  by mouth Daily.     • Cyanocobalamin (B-12 PO) Take  by mouth. 3 sprays daily     • diclofenac (VOLTAREN) 75 MG EC tablet Take 75 mg by mouth 2 (Two) Times a Day.     • fluticasone (FLONASE) 50 MCG/ACT nasal spray SHAKE LIQUID AND USE 1 TO 2 SPRAYS(50  MCG) IN EACH NOSTRIL EVERY DAY AS NEEDED 16 g 1   • Menthol, Topical Analgesic, (STOPAIN ROLL-ON EX) Apply  topically As Needed.     • prednisoLONE sodium phosphate (INFLAMASE FORTE) 1 % ophthalmic solution Instill 1 drop in operative eye QID the day before sx, the day of sx and 1 week after sx, then BID for 3 weeks     • rosuvastatin (CRESTOR) 10 MG tablet Take 1 tablet by mouth Every Night. 90 tablet 1   • telmisartan (MICARDIS) 20 MG tablet Take 1 tablet by mouth Daily. 90 tablet 1   • valACYclovir (VALTREX) 500 MG tablet Take 1 tablet by mouth Daily. 30 tablet 1   • venlafaxine (EFFEXOR) 75 MG tablet TAKE 1 TABLET BY MOUTH EVERY DAY AT BEDTIME 30 tablet 1     No current facility-administered medications for this visit.     Review of Systems   Constitutional: Negative.    Musculoskeletal:        Tenderness to distal right fifth metatarsal shaft   All other systems reviewed and are negative.      OBJECTIVE     Vitals:    01/27/22 1347   BP: 132/81   Pulse: 70   Temp: 97.1 °F (36.2 °C)   SpO2: 99%       WBC   Date Value Ref Range Status   01/21/2022 4.10 3.40 - 10.80 10*3/mm3 Final     RBC   Date Value Ref Range Status   01/21/2022 4.29 3.77 - 5.28 10*6/mm3 Final     Hemoglobin    Date Value Ref Range Status   01/21/2022 12.6 12.0 - 15.9 g/dL Final     Hematocrit   Date Value Ref Range Status   01/21/2022 40.0 34.0 - 46.6 % Final     MCV   Date Value Ref Range Status   01/21/2022 93.2 79.0 - 97.0 fL Final     MCH   Date Value Ref Range Status   01/21/2022 29.4 26.6 - 33.0 pg Final     MCHC   Date Value Ref Range Status   01/21/2022 31.5 31.5 - 35.7 g/dL Final     RDW   Date Value Ref Range Status   01/21/2022 13.0 12.3 - 15.4 % Final     RDW-SD   Date Value Ref Range Status   01/21/2022 44.4 37.0 - 54.0 fl Final     MPV   Date Value Ref Range Status   01/21/2022 10.7 6.0 - 12.0 fL Final     Platelets   Date Value Ref Range Status   01/21/2022 277 140 - 450 10*3/mm3 Final     Neutrophil %   Date Value Ref Range Status   01/21/2022 33.7 (L) 42.7 - 76.0 % Final     Lymphocyte %   Date Value Ref Range Status   01/21/2022 50.0 (H) 19.6 - 45.3 % Final     Monocyte %   Date Value Ref Range Status   01/21/2022 11.5 5.0 - 12.0 % Final     Eosinophil %   Date Value Ref Range Status   01/21/2022 4.4 0.3 - 6.2 % Final     Basophil %   Date Value Ref Range Status   01/21/2022 0.2 0.0 - 1.5 % Final     Immature Grans %   Date Value Ref Range Status   01/21/2022 0.2 0.0 - 0.5 % Final     Neutrophils, Absolute   Date Value Ref Range Status   01/21/2022 1.38 (L) 1.70 - 7.00 10*3/mm3 Final     Lymphocytes, Absolute   Date Value Ref Range Status   01/21/2022 2.05 0.70 - 3.10 10*3/mm3 Final     Monocytes, Absolute   Date Value Ref Range Status   01/21/2022 0.47 0.10 - 0.90 10*3/mm3 Final     Eosinophils, Absolute   Date Value Ref Range Status   01/21/2022 0.18 0.00 - 0.40 10*3/mm3 Final     Basophils, Absolute   Date Value Ref Range Status   01/21/2022 0.01 0.00 - 0.20 10*3/mm3 Final     Immature Grans, Absolute   Date Value Ref Range Status   01/21/2022 0.01 0.00 - 0.05 10*3/mm3 Final     nRBC   Date Value Ref Range Status   01/21/2022 0.0 0.0 - 0.2 /100 WBC Final         Lab Results   Component Value Date     GLUCOSE 89 01/21/2022    BUN 11 01/21/2022    CREATININE 0.85 01/21/2022    EGFRIFAFRI 84 01/21/2022    BCR 12.9 01/21/2022    K 3.8 01/21/2022    CO2 25.1 01/21/2022    CALCIUM 9.2 01/21/2022    ALBUMIN 4.10 01/21/2022    LABIL2 1.2 (L) 04/07/2021    AST 15 01/21/2022    ALT 14 01/21/2022       Patient seen in no apparent distress.      PHYSICAL EXAM:     Foot/Ankle Exam:       General:   Appearance: appears stated age and healthy    Orientation: AAOx3    Affect: appropriate    Gait: unimpaired    Shoe Gear:  Casual shoes    VASCULAR      Right Foot Vascularity   Normal vascular exam    Dorsalis pedis:  2+  Posterior tibial:  2+  Skin Temperature: warm    Edema Grading:  None  CFT:  < 3 seconds  Pedal Hair Growth:  Present  Varicosities: none       Left Foot Vascularity   Normal vascular exam    Dorsalis pedis:  2+  Posterior tibial:  2+  Skin Temperature: warm    Edema Grading:  None  CFT:  < 3 seconds  Pedal Hair Growth:  Present  Varicosities: none        NEUROLOGIC     Right Foot Neurologic   Normal sensation    Light touch sensation:  Normal  Vibratory sensation:  Normal  Hot/Cold sensation: normal    Protective Sensation using [place]ran Monofilament:  10     Left Foot Neurologic   Normal sensation    Light touch sensation:  Normal  Vibratory sensation:  Normal  Hot/cold sensation: normal    Protective Sensation using [place] Monofilament:  10     MUSCULOSKELETAL      Right Foot Musculoskeletal   Tenderness comment:  Distal right fifth metatarsal shaft.     MUSCLE STRENGTH     Right Foot Muscle Strength   Foot dorsiflexion:  5 (Guarding with strength testing)  Foot plantar flexion:  5  Foot inversion:  5  Foot eversion:  5     Left Foot Muscle Strength   Foot dorsiflexion:  5  Foot plantar flexion:  5  Foot inversion:  5  Foot eversion:  5     RANGE OF MOTION      Right Foot Range of Motion   Right ankle active dorsiflexion: Guarding with range of motion testing.     Left Foot Range of Motion    Foot and ankle ROM within normal limits       DERMATOLOGIC     Right Foot Dermatologic   Skin: skin intact    Nails: normal       Left Foot Dermatologic   Skin: skin intact    Nails: normal        RADIOLOGY:      XR Foot 3+ View Right    Result Date: 1/27/2022  Narrative: IN-OFFICE IMAGING:  3 view, AP, MO, Lateral, right foot Indication: Fifth metatarsal fracture follow-up Findings: No change in fracture fragments and distal fifth metatarsal shaft.  Early trabeculation seen across fracture fragments.  Comparison: No changes seen compared to previous views.       ASSESSMENT/PLAN     Diagnoses and all orders for this visit:    1. Foot pain, right (Primary)    2. Closed nondisplaced fracture of fifth metatarsal bone of right foot, sequela        Comprehensive lower extremity examination and evaluation was performed.    Discussed findings and treatment plan including risks, benefits, and treatment options with patient in detail. Patient agreed with treatment plan.    Medications and allergies reviewed.  Reviewed available lab values along with other pertinent labs.  These were discussed with the patient.    Rice Therapy: It is important to treat any injury as soon as possible to help control swelling and increase recovery time. The recognized regimen for immediate treatment of sport injuries includes rest, ice (cold application), compression, and elevation (RICE). Remove the injured athlete from play, apply ice to the affected area, wrap or compress the injured area with an elastic bandage when appropriate, and elevate the injured area above heart level to reduce swelling.  The patient is to not use ice for longer than 20 minutes at a time, with at least 20 minutes of no ice usage between applications.  The patient states understanding and agreement with this plan.    CAM walker applied to Right lower leg.  Patient instructed to utilize for partial-weight bearing at all time with CAM walker.  The patient states  understanding and agreement with this plan.  Dr. Schwarz advised patient may resume driving, but advised not to drive while wearing an ambulatory device.  Advised quick/hard depression of brakes could cause injury to areas.  Also advised of possible legal implications while driving in restrictive device.  The patient states understanding and agreement with these instructions.    An After Visit Summary was printed and given to the patient at discharge, including (if requested) any available informative/educational handouts regarding diagnosis, treatment, or medications. All questions were answered to patient/family satisfaction. Should symptoms fail to improve or worsen they agree to call or return to clinic or to go to the Emergency Department. Discussed the importance of following up with any needed screening tests/labs/specialist appointments and any requested follow-up recommended by me today. Importance of maintaining follow-up discussed and patient accepts that missed appointments can delay diagnosis and potentially lead to worsening of conditions.    Return in about 3 weeks (around 2/17/2022) for Fracture follow-up; x-ray., or sooner if acute issues arise.    This document has been electronically signed by Cb Schwarz DPM on January 27, 2022 14:25 EST

## 2022-02-17 ENCOUNTER — OFFICE VISIT (OUTPATIENT)
Dept: PODIATRY | Facility: CLINIC | Age: 57
End: 2022-02-17

## 2022-02-17 VITALS
DIASTOLIC BLOOD PRESSURE: 71 MMHG | BODY MASS INDEX: 31.41 KG/M2 | SYSTOLIC BLOOD PRESSURE: 130 MMHG | OXYGEN SATURATION: 100 % | HEART RATE: 77 BPM | TEMPERATURE: 96.9 F | HEIGHT: 64 IN | WEIGHT: 184 LBS

## 2022-02-17 DIAGNOSIS — S92.354D CLOSED NONDISPLACED FRACTURE OF FIFTH METATARSAL BONE OF RIGHT FOOT WITH ROUTINE HEALING, SUBSEQUENT ENCOUNTER: ICD-10-CM

## 2022-02-17 DIAGNOSIS — M79.671 FOOT PAIN, RIGHT: Primary | ICD-10-CM

## 2022-02-17 PROCEDURE — 99213 OFFICE O/P EST LOW 20 MIN: CPT | Performed by: PODIATRIST

## 2022-02-17 NOTE — PROGRESS NOTES
[facility] - PODIATRY    Today's Date: 22    Patient Name: Swapna Tay  MRN: [id number]  CSN: [id number]  PCP: Samantha Dash APRN, Last PCP Visit: 2021  Referring Provider:     SUBJECTIVE     Chief Complaint   Patient presents with   • Right Foot - Follow-up, Fracture     HPI: Swapna Tay, a 56 y.o.female, presents to clinic.    New, Established, New Problem: est    Location: Right fifth metatarsal    Duration: 2021    Onset: Acute, twisted her foot    Nature: Sore, achy    Stable, worsening, improving: improving    Aggravating factors:  Patient relates pain is aggravated by shoe gear and ambulation.      Previous Treatment: X-ray, CAM walker    Patient denies any fevers, chills, nausea, vomiting, shortness of breath, nor any other constitutional signs nor symptoms.    No other pedal complaints at this time.    Recent medical changes: None    Past Medical History:   Diagnosis Date   • Allergic rhinitis    • Carpal tunnel syndrome, bilateral    • Cataract    • Contact dermatitis    • Eczema    • Gastric ulcer    • Genital herpes    • High cholesterol    • Hyperlipidemia 2018   • Hypertension    • Menopause    • Pap smear for cervical cancer screening 01/15/2020    normal repeat 5 yrs    • Plantar fasciitis    • Restless leg syndrome    • Screening mammogram, encounter for 2021    WNL   • Seasonal allergies      Past Surgical History:   Procedure Laterality Date   •  SECTION     • COLONOSCOPY     • HERNIA REPAIR  ,    • OTHER SURGICAL HISTORY      [facility] [place] U[facility] Balloon therapy - Dr. Douglas [place], [place]     Family History   Problem Relation Age of Onset   • Stroke Mother    • Arthritis Mother    • Diabetes Father    • Arthritis Father    • Cancer Sister    • Ovarian cancer Sister    • Arthritis Sister    • Cancer Brother    • Colon cancer Brother      Social History     Socioeconomic History   •  Marital status:    Tobacco Use   • Smoking status: Never Smoker   • Smokeless tobacco: Never Used   Vaping Use   • Vaping Use: Never used   Substance and Sexual Activity   • Alcohol use: Never   • Drug use: Never   • Sexual activity: Defer     Allergies   Allergen Reactions   • Lactose Intolerance (Gi) Unknown - Low Severity     Current Outpatient Medications   Medication Sig Dispense Refill   • ascorbic acid (VITAMIN C) 1000 MG tablet Take 1,000 mg by mouth Daily.     • BLACK ELDERBERRY PO Take  by mouth Daily.     • Cyanocobalamin (B-12 PO) Take  by mouth. 3 sprays daily     • diclofenac (VOLTAREN) 75 MG EC tablet Take 75 mg by mouth 2 (Two) Times a Day.     • fluticasone (FLONASE) 50 MCG/ACT nasal spray SHAKE LIQUID AND USE 1 TO 2 SPRAYS(50  MCG) IN EACH NOSTRIL EVERY DAY AS NEEDED 16 g 1   • Menthol, Topical Analgesic, (STOPAIN ROLL-ON EX) Apply  topically As Needed.     • prednisoLONE sodium phosphate (INFLAMASE FORTE) 1 % ophthalmic solution Instill 1 drop in operative eye QID the day before sx, the day of sx and 1 week after sx, then BID for 3 weeks     • rosuvastatin (CRESTOR) 10 MG tablet Take 1 tablet by mouth Every Night. 90 tablet 1   • telmisartan (MICARDIS) 20 MG tablet Take 1 tablet by mouth Daily. 90 tablet 1   • valACYclovir (VALTREX) 500 MG tablet Take 1 tablet by mouth Daily. 30 tablet 1   • venlafaxine (EFFEXOR) 75 MG tablet TAKE 1 TABLET BY MOUTH EVERY DAY AT BEDTIME 30 tablet 1     No current facility-administered medications for this visit.     Review of Systems   Constitutional: Negative.    Musculoskeletal:        No tenderness to right fifth metatarsal shaft   All other systems reviewed and are negative.      OBJECTIVE     Vitals:    02/17/22 1506   BP: 130/71   Pulse: 77   Temp: 96.9 °F (36.1 °C)   SpO2: 100%       WBC   Date Value Ref Range Status   01/21/2022 4.10 3.40 - 10.80 10*3/mm3 Final     RBC   Date Value Ref Range Status   01/21/2022 4.29 3.77 - 5.28 10*6/mm3 Final      Hemoglobin   Date Value Ref Range Status   01/21/2022 12.6 12.0 - 15.9 g/dL Final     Hematocrit   Date Value Ref Range Status   01/21/2022 40.0 34.0 - 46.6 % Final     MCV   Date Value Ref Range Status   01/21/2022 93.2 79.0 - 97.0 fL Final     MCH   Date Value Ref Range Status   01/21/2022 29.4 26.6 - 33.0 pg Final     MCHC   Date Value Ref Range Status   01/21/2022 31.5 31.5 - 35.7 g/dL Final     RDW   Date Value Ref Range Status   01/21/2022 13.0 12.3 - 15.4 % Final     RDW-SD   Date Value Ref Range Status   01/21/2022 44.4 37.0 - 54.0 fl Final     MPV   Date Value Ref Range Status   01/21/2022 10.7 6.0 - 12.0 fL Final     Platelets   Date Value Ref Range Status   01/21/2022 277 140 - 450 10*3/mm3 Final     Neutrophil %   Date Value Ref Range Status   01/21/2022 33.7 (L) 42.7 - 76.0 % Final     Lymphocyte %   Date Value Ref Range Status   01/21/2022 50.0 (H) 19.6 - 45.3 % Final     Monocyte %   Date Value Ref Range Status   01/21/2022 11.5 5.0 - 12.0 % Final     Eosinophil %   Date Value Ref Range Status   01/21/2022 4.4 0.3 - 6.2 % Final     Basophil %   Date Value Ref Range Status   01/21/2022 0.2 0.0 - 1.5 % Final     Immature Grans %   Date Value Ref Range Status   01/21/2022 0.2 0.0 - 0.5 % Final     Neutrophils, Absolute   Date Value Ref Range Status   01/21/2022 1.38 (L) 1.70 - 7.00 10*3/mm3 Final     Lymphocytes, Absolute   Date Value Ref Range Status   01/21/2022 2.05 0.70 - 3.10 10*3/mm3 Final     Monocytes, Absolute   Date Value Ref Range Status   01/21/2022 0.47 0.10 - 0.90 10*3/mm3 Final     Eosinophils, Absolute   Date Value Ref Range Status   01/21/2022 0.18 0.00 - 0.40 10*3/mm3 Final     Basophils, Absolute   Date Value Ref Range Status   01/21/2022 0.01 0.00 - 0.20 10*3/mm3 Final     Immature Grans, Absolute   Date Value Ref Range Status   01/21/2022 0.01 0.00 - 0.05 10*3/mm3 Final     nRBC   Date Value Ref Range Status   01/21/2022 0.0 0.0 - 0.2 /100 WBC Final         Lab Results    Component Value Date    GLUCOSE 89 01/21/2022    BUN 11 01/21/2022    CREATININE 0.85 01/21/2022    EGFRIFAFRI 84 01/21/2022    BCR 12.9 01/21/2022    K 3.8 01/21/2022    CO2 25.1 01/21/2022    CALCIUM 9.2 01/21/2022    ALBUMIN 4.10 01/21/2022    LABIL2 1.2 (L) 04/07/2021    AST 15 01/21/2022    ALT 14 01/21/2022       Patient seen in no apparent distress.      PHYSICAL EXAM:     Foot/Ankle Exam:       General:   Appearance: appears stated age and healthy    Orientation: AAOx3    Affect: appropriate    Gait: unimpaired    Shoe Gear:  CAM boot (Right foot)    VASCULAR      Right Foot Vascularity   Normal vascular exam    Dorsalis pedis:  2+  Posterior tibial:  2+  Skin Temperature: warm    Edema Grading:  None  CFT:  < 3 seconds  Pedal Hair Growth:  Present  Varicosities: none       Left Foot Vascularity   Normal vascular exam    Dorsalis pedis:  2+  Posterior tibial:  2+  Skin Temperature: warm    Edema Grading:  None  CFT:  < 3 seconds  Pedal Hair Growth:  Present  Varicosities: none        NEUROLOGIC     Right Foot Neurologic   Normal sensation    Light touch sensation:  Normal  Vibratory sensation:  Normal  Hot/Cold sensation: normal    Protective Sensation using [place]-Eran Monofilament:  10     Left Foot Neurologic   Normal sensation    Light touch sensation:  Normal  Vibratory sensation:  Normal  Hot/cold sensation: normal    Protective Sensation using [place]ran Monofilament:  10     MUSCULOSKELETAL      Right Foot Musculoskeletal   Tenderness comment:  Distal right fifth metatarsal shaft.     MUSCLE STRENGTH     Right Foot Muscle Strength   Normal strength    Foot dorsiflexion:  5  Foot plantar flexion:  5  Foot inversion:  5  Foot eversion:  5     Left Foot Muscle Strength   Normal strength    Foot dorsiflexion:  5  Foot plantar flexion:  5  Foot inversion:  5  Foot eversion:  5     RANGE OF MOTION      Right Foot Range of Motion   Foot and ankle ROM within normal limits       Left Foot Range  of Motion   Foot and ankle ROM within normal limits       DERMATOLOGIC     Right Foot Dermatologic   Skin: skin intact    Nails: normal       Left Foot Dermatologic   Skin: skin intact    Nails: normal        RADIOLOGY:      XR Foot 3+ View Right    Result Date: 2/17/2022  Narrative: IN-OFFICE IMAGING:    3 view, AP, MO, Lateral, right foot Indication: Fifth metatarsal fracture follow-up Findings: No change in position of the fracture fragments and distal fifth metatarsal shaft.    Increase in trabeculation seen across fracture fragments consistent with proper healing.  Comparison: No changes seen compared to previous views.    ASSESSMENT/PLAN     Diagnoses and all orders for this visit:    1. Foot pain, right (Primary)    2. Closed nondisplaced fracture of fifth metatarsal bone of right foot with routine healing, subsequent encounter        Comprehensive lower extremity examination and evaluation was performed.    Discussed findings and treatment plan including risks, benefits, and treatment options with patient in detail. Patient agreed with treatment plan.    Medications and allergies reviewed.  Reviewed available lab values along with other pertinent labs.  These were discussed with the patient.    Patient may begin to weight bear as tolerated in supportive shoes.  No impact activities for one month.  After that time, the patient may increase activities as tolerated.  Patient states understanding and agreement with this plan.  An After Visit Summary was printed and given to the patient at discharge, including (if requested) any available informative/educational handouts regarding diagnosis, treatment, or medications. All questions were answered to patient/family satisfaction. Should symptoms fail to improve or worsen they agree to call or return to clinic or to go to the Emergency Department. Discussed the importance of following up with any needed screening tests/labs/specialist appointments and any requested  follow-up recommended by me today. Importance of maintaining follow-up discussed and patient accepts that missed appointments can delay diagnosis and potentially lead to worsening of conditions.    Return if symptoms worsen or fail to improve., or sooner if acute issues arise.    This document has been electronically signed by Cb Schwarz DPM on February 17, 2022 15:42 EST

## 2022-02-28 RX ORDER — LORATADINE 10 MG/1
TABLET ORAL
Qty: 90 TABLET | Refills: 1 | Status: SHIPPED | OUTPATIENT
Start: 2022-02-28

## 2022-03-09 ENCOUNTER — TELEPHONE (OUTPATIENT)
Dept: FAMILY MEDICINE CLINIC | Facility: CLINIC | Age: 57
End: 2022-03-09

## 2022-03-09 ENCOUNTER — OFFICE VISIT (OUTPATIENT)
Dept: FAMILY MEDICINE CLINIC | Facility: CLINIC | Age: 57
End: 2022-03-09

## 2022-03-09 VITALS
OXYGEN SATURATION: 97 % | HEART RATE: 80 BPM | SYSTOLIC BLOOD PRESSURE: 135 MMHG | HEIGHT: 64 IN | WEIGHT: 185 LBS | DIASTOLIC BLOOD PRESSURE: 90 MMHG | BODY MASS INDEX: 31.58 KG/M2

## 2022-03-09 DIAGNOSIS — H66.90 ACUTE OTITIS MEDIA, UNSPECIFIED OTITIS MEDIA TYPE: Primary | ICD-10-CM

## 2022-03-09 DIAGNOSIS — F33.0 MAJOR DEPRESSIVE DISORDER, RECURRENT EPISODE, MILD DEGREE: ICD-10-CM

## 2022-03-09 PROCEDURE — 99213 OFFICE O/P EST LOW 20 MIN: CPT | Performed by: NURSE PRACTITIONER

## 2022-03-09 RX ORDER — VENLAFAXINE 75 MG/1
TABLET ORAL
Qty: 90 TABLET | Refills: 1 | Status: SHIPPED | OUTPATIENT
Start: 2022-03-09 | End: 2022-06-15

## 2022-03-09 RX ORDER — AMOXICILLIN AND CLAVULANATE POTASSIUM 875; 125 MG/1; MG/1
1 TABLET, FILM COATED ORAL 2 TIMES DAILY
Qty: 20 TABLET | Refills: 0 | Status: SHIPPED | OUTPATIENT
Start: 2022-03-09 | End: 2022-03-19

## 2022-03-09 NOTE — PROGRESS NOTES
Chief Complaint  Earache    Subjective            Swapna Tay presents to [facility]  Pt here today c/o right ear pain.     Pt went to [facility] clinic 2 weeks ago was given Prednisone and told she has fluid on both ears. Pt states the left has gotten better.     Pt states the pain is worse at night when she lays down and hurts when she coughs.         Past Medical History:   Diagnosis Date   • Allergic rhinitis    • Carpal tunnel syndrome, bilateral    • Cataract    • Contact dermatitis    • Eczema    • Gastric ulcer    • Genital herpes    • High cholesterol    • Hyperlipidemia 2018   • Hypertension    • Menopause    • Pap smear for cervical cancer screening 01/15/2020    normal repeat 5 yrs    • Plantar fasciitis    • Restless leg syndrome    • Screening mammogram, encounter for 2021    WNL   • Seasonal allergies        Allergies   Allergen Reactions   • Lactose Intolerance (Gi) Unknown - Low Severity        Past Surgical History:   Procedure Laterality Date   •  SECTION     • COLONOSCOPY     • HERNIA REPAIR  ,    • OTHER SURGICAL HISTORY      [facility] [place] - Dr. Misael [place], [place]        Social History     Tobacco Use   • Smoking status: Never Smoker   • Smokeless tobacco: Never Used   Substance Use Topics   • Alcohol use: Never       Family History   Problem Relation Age of Onset   • Stroke Mother    • Arthritis Mother    • Diabetes Father    • Arthritis Father    • Cancer Sister    • Ovarian cancer Sister    • Arthritis Sister    • Cancer Brother    • Colon cancer Brother         Current Outpatient Medications on File Prior to Visit   Medication Sig   • ascorbic acid (VITAMIN C) 1000 MG tablet Take 1,000 mg by mouth Daily.   • BLACK ELDERBERRY PO Take  by mouth Daily.   • Cyanocobalamin (B-12 PO) Take  by mouth. 3 sprays daily   • diclofenac (VOLTAREN) 75 MG EC tablet Take 75 mg by mouth 2 (Two) Times a Day.  "  • fluticasone (FLONASE) 50 MCG/ACT nasal spray SHAKE LIQUID AND USE 1 TO 2 SPRAYS(50  MCG) IN EACH NOSTRIL EVERY DAY AS NEEDED   • loratadine (CLARITIN) 10 MG tablet TAKE 1 TABLET BY MOUTH ONCE DAILY   • Menthol, Topical Analgesic, (STOPAIN ROLL-ON EX) Apply  topically As Needed.   • prednisoLONE sodium phosphate (INFLAMASE FORTE) 1 % ophthalmic solution Instill 1 drop in operative eye QID the day before sx, the day of sx and 1 week after sx, then BID for 3 weeks   • rosuvastatin (CRESTOR) 10 MG tablet Take 1 tablet by mouth Every Night.   • telmisartan (MICARDIS) 20 MG tablet Take 1 tablet by mouth Daily.   • valACYclovir (VALTREX) 500 MG tablet Take 1 tablet by mouth Daily.   • venlafaxine (EFFEXOR) 75 MG tablet TAKE 1 TABLET BY MOUTH EVERY DAY AT BEDTIME     No current facility-administered medications on file prior to visit.       Health Maintenance Due   Topic Date Due   • ANNUAL PHYSICAL  Never done   • ZOSTER VACCINE (1 of 2) Never done   • HEPATITIS C SCREENING  Never done       Objective     /90   Pulse 80   Ht 162.6 cm (64\")   Wt 83.9 kg (185 lb)   SpO2 97%   BMI 31.76 kg/m²       Physical Exam  Constitutional:       General: She is not in acute distress.     Appearance: Normal appearance. She is not ill-appearing.   HENT:      Head: Normocephalic and atraumatic.      Right Ear: Tympanic membrane is injected, erythematous and bulging.      Left Ear: Tympanic membrane, ear canal and external ear normal.      Mouth/Throat:      Pharynx: No oropharyngeal exudate or posterior oropharyngeal erythema.   Cardiovascular:      Rate and Rhythm: Normal rate and regular rhythm.      Heart sounds: Normal heart sounds. No murmur heard.  Pulmonary:      Effort: Pulmonary effort is normal. No respiratory distress.      Breath sounds: Normal breath sounds.   Chest:      Chest wall: No tenderness.   Abdominal:      General: There is no distension.      Palpations: There is no mass.      Tenderness: There " is no abdominal tenderness. There is no guarding.   Musculoskeletal:         General: No swelling or tenderness. Normal range of motion.      Cervical back: Normal range of motion and neck supple.   Skin:     General: Skin is warm and dry.      Findings: No rash.   Neurological:      General: No focal deficit present.      Mental Status: She is alert and oriented to person, place, and time. Mental status is at baseline.      Gait: Gait normal.   Psychiatric:         Mood and Affect: Mood normal.         Behavior: Behavior normal.         Thought Content: Thought content normal.         Judgment: Judgment normal.           Result Review :                           Assessment and Plan        Diagnoses and all orders for this visit:    1. Acute otitis media, unspecified otitis media type (Primary)  -     amoxicillin-clavulanate (Augmentin) 875-125 MG per tablet; Take 1 tablet by mouth 2 (Two) Times a Day for 10 days.  Dispense: 20 tablet; Refill: 0              Follow Up     Return if symptoms worsen or fail to improve.    Patient was given instructions and counseling regarding her condition or for health maintenance advice. Please see specific information pulled into the AVS if appropriate.     Swapna Tay  reports that she has never smoked. She has never used smokeless tobacco..

## 2022-03-09 NOTE — TELEPHONE ENCOUNTER
----- Message from Swapna Tay sent at 3/9/2022  2:39 AM EST -----  Regarding: appointment  Good morning Mrs. Dash,  Is there anyway you can work me into  your schedule? I called Monday morning to try to get in ASAP because I have a earache , but  nothing was available until Thursday at 8.   I went to urgent care 02/26/22 and was told I had fluid in my ears and was prescribed Prednisone. I took the medication as directed and my left ear is still causing e pain.    Please, help me. You can text or call  V/r  Swapna Tay  [phone]

## 2022-03-17 DIAGNOSIS — B02.8 HERPES ZOSTER WITH COMPLICATION: ICD-10-CM

## 2022-03-17 RX ORDER — VALACYCLOVIR HYDROCHLORIDE 500 MG/1
500 TABLET, FILM COATED ORAL DAILY
Qty: 30 TABLET | Refills: 1 | Status: SHIPPED | OUTPATIENT
Start: 2022-03-17 | End: 2022-07-15

## 2022-05-12 ENCOUNTER — TELEPHONE (OUTPATIENT)
Dept: FAMILY MEDICINE CLINIC | Facility: CLINIC | Age: 57
End: 2022-05-12

## 2022-05-12 ENCOUNTER — HOSPITAL ENCOUNTER (OUTPATIENT)
Dept: MAMMOGRAPHY | Facility: HOSPITAL | Age: 57
Discharge: HOME | End: 2022-05-12
Admitting: NURSE PRACTITIONER

## 2022-05-12 DIAGNOSIS — Z12.31 ENCOUNTER FOR SCREENING MAMMOGRAM FOR MALIGNANT NEOPLASM OF BREAST: ICD-10-CM

## 2022-05-12 PROCEDURE — 77063 BREAST TOMOSYNTHESIS BI: CPT

## 2022-05-12 PROCEDURE — 77067 SCR MAMMO BI INCL CAD: CPT

## 2022-05-12 NOTE — TELEPHONE ENCOUNTER
----- Message from AMRITA Givens sent at 5/12/2022 11:42 AM EDT -----  Normal mammogram repeat in 1 year

## 2022-06-15 DIAGNOSIS — F33.0 MAJOR DEPRESSIVE DISORDER, RECURRENT EPISODE, MILD DEGREE: ICD-10-CM

## 2022-06-15 RX ORDER — VENLAFAXINE 75 MG/1
TABLET ORAL
Qty: 90 TABLET | Refills: 1 | Status: SHIPPED | OUTPATIENT
Start: 2022-06-15

## 2022-07-15 DIAGNOSIS — B02.8 HERPES ZOSTER WITH COMPLICATION: ICD-10-CM

## 2022-07-15 RX ORDER — VALACYCLOVIR HYDROCHLORIDE 500 MG/1
500 TABLET, FILM COATED ORAL DAILY
Qty: 30 TABLET | Refills: 1 | Status: SHIPPED | OUTPATIENT
Start: 2022-07-15

## 2022-07-28 DIAGNOSIS — E78.5 HYPERLIPIDEMIA, UNSPECIFIED HYPERLIPIDEMIA TYPE: ICD-10-CM

## 2022-07-29 RX ORDER — ROSUVASTATIN CALCIUM 10 MG/1
10 TABLET, COATED ORAL NIGHTLY
Qty: 90 TABLET | Refills: 0 | Status: SHIPPED | OUTPATIENT
Start: 2022-07-29 | End: 2022-10-21

## 2022-09-20 DIAGNOSIS — I10 HYPERTENSION, UNSPECIFIED TYPE: ICD-10-CM

## 2022-09-20 RX ORDER — TELMISARTAN 20 MG/1
20 TABLET ORAL DAILY
Qty: 90 TABLET | Refills: 1 | OUTPATIENT
Start: 2022-09-20

## 2022-10-21 DIAGNOSIS — E78.5 HYPERLIPIDEMIA, UNSPECIFIED HYPERLIPIDEMIA TYPE: ICD-10-CM

## 2022-10-21 RX ORDER — ROSUVASTATIN CALCIUM 10 MG/1
10 TABLET, COATED ORAL NIGHTLY
Qty: 30 TABLET | Refills: 0 | Status: SHIPPED | OUTPATIENT
Start: 2022-10-21

## 2022-10-21 RX ORDER — ROSUVASTATIN CALCIUM 10 MG/1
10 TABLET, COATED ORAL NIGHTLY
Qty: 90 TABLET | OUTPATIENT
Start: 2022-10-21